# Patient Record
Sex: FEMALE | Race: WHITE | NOT HISPANIC OR LATINO | URBAN - METROPOLITAN AREA
[De-identification: names, ages, dates, MRNs, and addresses within clinical notes are randomized per-mention and may not be internally consistent; named-entity substitution may affect disease eponyms.]

---

## 2022-06-15 ENCOUNTER — EMERGENCY (EMERGENCY)
Facility: HOSPITAL | Age: 52
LOS: 1 days | Discharge: HOME | End: 2022-06-15
Attending: EMERGENCY MEDICINE | Admitting: EMERGENCY MEDICINE
Payer: MEDICAID

## 2022-06-15 VITALS
OXYGEN SATURATION: 98 % | SYSTOLIC BLOOD PRESSURE: 153 MMHG | TEMPERATURE: 98 F | RESPIRATION RATE: 20 BRPM | HEART RATE: 110 BPM | DIASTOLIC BLOOD PRESSURE: 85 MMHG | WEIGHT: 166.89 LBS

## 2022-06-15 VITALS
TEMPERATURE: 98 F | DIASTOLIC BLOOD PRESSURE: 95 MMHG | SYSTOLIC BLOOD PRESSURE: 149 MMHG | HEART RATE: 94 BPM | RESPIRATION RATE: 18 BRPM | OXYGEN SATURATION: 100 %

## 2022-06-15 LAB
ALBUMIN SERPL ELPH-MCNC: 4.1 G/DL — SIGNIFICANT CHANGE UP (ref 3.3–5)
ALP SERPL-CCNC: 176 U/L — HIGH (ref 40–120)
ALT FLD-CCNC: 115 U/L — HIGH (ref 12–78)
ANION GAP SERPL CALC-SCNC: 12 MMOL/L — SIGNIFICANT CHANGE UP (ref 5–17)
AST SERPL-CCNC: 100 U/L — HIGH (ref 15–37)
BASOPHILS # BLD AUTO: 0 K/UL — SIGNIFICANT CHANGE UP (ref 0–0.2)
BASOPHILS NFR BLD AUTO: 0 % — SIGNIFICANT CHANGE UP (ref 0–2)
BILIRUB SERPL-MCNC: 0.8 MG/DL — SIGNIFICANT CHANGE UP (ref 0.2–1.2)
BUN SERPL-MCNC: 4 MG/DL — LOW (ref 7–23)
CALCIUM SERPL-MCNC: 9.8 MG/DL — SIGNIFICANT CHANGE UP (ref 8.5–10.1)
CHLORIDE SERPL-SCNC: 105 MMOL/L — SIGNIFICANT CHANGE UP (ref 96–108)
CO2 SERPL-SCNC: 21 MMOL/L — LOW (ref 22–31)
CREAT SERPL-MCNC: 0.57 MG/DL — SIGNIFICANT CHANGE UP (ref 0.5–1.3)
EGFR: 111 ML/MIN/1.73M2 — SIGNIFICANT CHANGE UP
EOSINOPHIL # BLD AUTO: 0 K/UL — SIGNIFICANT CHANGE UP (ref 0–0.5)
EOSINOPHIL NFR BLD AUTO: 0 % — SIGNIFICANT CHANGE UP (ref 0–6)
GLUCOSE SERPL-MCNC: 125 MG/DL — HIGH (ref 70–99)
HCT VFR BLD CALC: 46.7 % — HIGH (ref 34.5–45)
HGB BLD-MCNC: 15.9 G/DL — HIGH (ref 11.5–15.5)
HMPV RNA SPEC QL NAA+PROBE: DETECTED
LYMPHOCYTES # BLD AUTO: 0.84 K/UL — LOW (ref 1–3.3)
LYMPHOCYTES # BLD AUTO: 5 % — LOW (ref 13–44)
MAGNESIUM SERPL-MCNC: 2.3 MG/DL — SIGNIFICANT CHANGE UP (ref 1.6–2.6)
MCHC RBC-ENTMCNC: 34 GM/DL — SIGNIFICANT CHANGE UP (ref 32–36)
MCHC RBC-ENTMCNC: 34 PG — SIGNIFICANT CHANGE UP (ref 27–34)
MCV RBC AUTO: 99.8 FL — SIGNIFICANT CHANGE UP (ref 80–100)
MONOCYTES # BLD AUTO: 0.67 K/UL — SIGNIFICANT CHANGE UP (ref 0–0.9)
MONOCYTES NFR BLD AUTO: 4 % — SIGNIFICANT CHANGE UP (ref 2–14)
NEUTROPHILS # BLD AUTO: 15.3 K/UL — HIGH (ref 1.8–7.4)
NEUTROPHILS NFR BLD AUTO: 89 % — HIGH (ref 43–77)
NRBC # BLD: SIGNIFICANT CHANGE UP /100 WBCS (ref 0–0)
PLATELET # BLD AUTO: 196 K/UL — SIGNIFICANT CHANGE UP (ref 150–400)
POTASSIUM SERPL-MCNC: 3.8 MMOL/L — SIGNIFICANT CHANGE UP (ref 3.5–5.3)
POTASSIUM SERPL-SCNC: 3.8 MMOL/L — SIGNIFICANT CHANGE UP (ref 3.5–5.3)
PROT SERPL-MCNC: 8.4 G/DL — HIGH (ref 6–8.3)
RAPID RVP RESULT: DETECTED
RBC # BLD: 4.68 M/UL — SIGNIFICANT CHANGE UP (ref 3.8–5.2)
RBC # FLD: 12.3 % — SIGNIFICANT CHANGE UP (ref 10.3–14.5)
SARS-COV-2 RNA SPEC QL NAA+PROBE: SIGNIFICANT CHANGE UP
SODIUM SERPL-SCNC: 138 MMOL/L — SIGNIFICANT CHANGE UP (ref 135–145)
TROPONIN I, HIGH SENSITIVITY RESULT: 4.7 NG/L — SIGNIFICANT CHANGE UP
WBC # BLD: 16.81 K/UL — HIGH (ref 3.8–10.5)
WBC # FLD AUTO: 16.81 K/UL — HIGH (ref 3.8–10.5)

## 2022-06-15 PROCEDURE — 85025 COMPLETE CBC W/AUTO DIFF WBC: CPT

## 2022-06-15 PROCEDURE — 80053 COMPREHEN METABOLIC PANEL: CPT

## 2022-06-15 PROCEDURE — 93010 ELECTROCARDIOGRAM REPORT: CPT

## 2022-06-15 PROCEDURE — 0225U NFCT DS DNA&RNA 21 SARSCOV2: CPT

## 2022-06-15 PROCEDURE — 83735 ASSAY OF MAGNESIUM: CPT

## 2022-06-15 PROCEDURE — 99285 EMERGENCY DEPT VISIT HI MDM: CPT | Mod: 25

## 2022-06-15 PROCEDURE — 84484 ASSAY OF TROPONIN QUANT: CPT

## 2022-06-15 PROCEDURE — 71046 X-RAY EXAM CHEST 2 VIEWS: CPT

## 2022-06-15 PROCEDURE — 36415 COLL VENOUS BLD VENIPUNCTURE: CPT

## 2022-06-15 PROCEDURE — 71046 X-RAY EXAM CHEST 2 VIEWS: CPT | Mod: 26

## 2022-06-15 PROCEDURE — 99285 EMERGENCY DEPT VISIT HI MDM: CPT

## 2022-06-15 PROCEDURE — 93005 ELECTROCARDIOGRAM TRACING: CPT

## 2022-06-15 RX ORDER — CEFTRIAXONE 500 MG/1
1000 INJECTION, POWDER, FOR SOLUTION INTRAMUSCULAR; INTRAVENOUS ONCE
Refills: 0 | Status: COMPLETED | OUTPATIENT
Start: 2022-06-15 | End: 2022-06-15

## 2022-06-15 RX ORDER — SODIUM CHLORIDE 9 MG/ML
1000 INJECTION INTRAMUSCULAR; INTRAVENOUS; SUBCUTANEOUS ONCE
Refills: 0 | Status: COMPLETED | OUTPATIENT
Start: 2022-06-15 | End: 2022-06-15

## 2022-06-15 RX ADMIN — CEFTRIAXONE 100 MILLIGRAM(S): 500 INJECTION, POWDER, FOR SOLUTION INTRAMUSCULAR; INTRAVENOUS at 22:36

## 2022-06-15 RX ADMIN — SODIUM CHLORIDE 1000 MILLILITER(S): 9 INJECTION INTRAMUSCULAR; INTRAVENOUS; SUBCUTANEOUS at 21:08

## 2022-06-15 RX ADMIN — CEFTRIAXONE 1000 MILLIGRAM(S): 500 INJECTION, POWDER, FOR SOLUTION INTRAMUSCULAR; INTRAVENOUS at 23:11

## 2022-06-15 RX ADMIN — SODIUM CHLORIDE 1000 MILLILITER(S): 9 INJECTION INTRAMUSCULAR; INTRAVENOUS; SUBCUTANEOUS at 22:10

## 2022-06-15 NOTE — ED PROVIDER NOTE - NS ED ATTENDING STATEMENT MOD
This was a shared visit with the JOSUE. I reviewed and verified the documentation and independently performed the documented:

## 2022-06-15 NOTE — ED PROVIDER NOTE - NSFOLLOWUPINSTRUCTIONS_ED_ALL_ED_FT
An upper respiratory infection (URI) affects the nose, throat, and upper air passages. URIs are caused by germs (viruses). The most common type of URI is often called "the common cold."    Medicines cannot cure URIs, but you can do things at home to relieve your symptoms. URIs usually get better within 7–10 days.      Follow these instructions at home:    Activity     •Rest as needed.    •If you have a fever, stay home from work or school until your fever is gone, or until your doctor says you may return to work or school.  •You should stay home until you cannot spread the infection anymore (you are not contagious).      •Your doctor may have you wear a face mask so you have less risk of spreading the infection.        Relieving symptoms     •Gargle with a salt-water mixture 3–4 times a day or as needed. To make a salt-water mixture, completely dissolve ½–1 tsp of salt in 1 cup of warm water.      •Use a cool-mist humidifier to add moisture to the air. This can help you breathe more easily.        Eating and drinking      •Drink enough fluid to keep your pee (urine) pale yellow.      •Eat soups and other clear broths.        General instructions      •Take over-the-counter and prescription medicines only as told by your doctor. These include cold medicines, fever reducers, and cough suppressants.      • Do not use any products that contain nicotine or tobacco. These include cigarettes and e-cigarettes. If you need help quitting, ask your doctor.      •Avoid being where people are smoking (avoid secondhand smoke).      •Make sure you get regular shots and get the flu shot every year.      •Keep all follow-up visits as told by your doctor. This is important.        How to avoid spreading infection to others      •Wash your hands often with soap and water. If you do not have soap and water, use hand .      •Avoid touching your mouth, face, eyes, or nose.      •Cough or sneeze into a tissue or your sleeve or elbow. Do not cough or sneeze into your hand or into the air.        Contact a doctor if:    •You are getting worse, not better.    •You have any of these:  •A fever.      •Chills.      •Brown or red mucus in your nose.      •Yellow or brown fluid (discharge)coming from your nose.      •Pain in your face, especially when you bend forward.      •Swollen neck glands.      •Pain with swallowing.      •White areas in the back of your throat.          Get help right away if:    •You have shortness of breath that gets worse.    •You have very bad or constant:  •Headache.      •Ear pain.      •Pain in your forehead, behind your eyes, and over your cheekbones (sinus pain).      •Chest pain.      •You have long-lasting (chronic) lung disease along with any of these:  •Wheezing.      •Long-lasting cough.      •Coughing up blood.      •A change in your usual mucus.        •You have a stiff neck.    •You have changes in your:  •Vision.      •Hearing.      •Thinking.      •Mood.          Summary    •An upper respiratory infection (URI) is caused by a germ called a virus. The most common type of URI is often called "the common cold."      •URIs usually get better within 7–10 days.      •Take over-the-counter and prescription medicines only as told by your doctor.      This information is not intended to replace advice given to you by your health care provider. Make sure you discuss any questions you have with your health care provider. Continue your medications as prescribed.  Stay hydrated.  Motrin for pain, fever.  Follow up with your medical doctor for repeat labs and further evaluation.  Return to the Emergency Department for worsening or concerning symptoms.    An upper respiratory infection (URI) affects the nose, throat, and upper air passages. URIs are caused by germs (viruses). The most common type of URI is often called "the common cold."    Medicines cannot cure URIs, but you can do things at home to relieve your symptoms. URIs usually get better within 7–10 days.      Follow these instructions at home:    Activity     •Rest as needed.    •If you have a fever, stay home from work or school until your fever is gone, or until your doctor says you may return to work or school.  •You should stay home until you cannot spread the infection anymore (you are not contagious).      •Your doctor may have you wear a face mask so you have less risk of spreading the infection.        Relieving symptoms     •Gargle with a salt-water mixture 3–4 times a day or as needed. To make a salt-water mixture, completely dissolve ½–1 tsp of salt in 1 cup of warm water.      •Use a cool-mist humidifier to add moisture to the air. This can help you breathe more easily.        Eating and drinking      •Drink enough fluid to keep your pee (urine) pale yellow.      •Eat soups and other clear broths.        General instructions      •Take over-the-counter and prescription medicines only as told by your doctor. These include cold medicines, fever reducers, and cough suppressants.      • Do not use any products that contain nicotine or tobacco. These include cigarettes and e-cigarettes. If you need help quitting, ask your doctor.      •Avoid being where people are smoking (avoid secondhand smoke).      •Make sure you get regular shots and get the flu shot every year.      •Keep all follow-up visits as told by your doctor. This is important.        How to avoid spreading infection to others      •Wash your hands often with soap and water. If you do not have soap and water, use hand .      •Avoid touching your mouth, face, eyes, or nose.      •Cough or sneeze into a tissue or your sleeve or elbow. Do not cough or sneeze into your hand or into the air.        Contact a doctor if:    •You are getting worse, not better.    •You have any of these:  •A fever.      •Chills.      •Brown or red mucus in your nose.      •Yellow or brown fluid (discharge)coming from your nose.      •Pain in your face, especially when you bend forward.      •Swollen neck glands.      •Pain with swallowing.      •White areas in the back of your throat.          Get help right away if:    •You have shortness of breath that gets worse.    •You have very bad or constant:  •Headache.      •Ear pain.      •Pain in your forehead, behind your eyes, and over your cheekbones (sinus pain).      •Chest pain.      •You have long-lasting (chronic) lung disease along with any of these:  •Wheezing.      •Long-lasting cough.      •Coughing up blood.      •A change in your usual mucus.        •You have a stiff neck.    •You have changes in your:  •Vision.      •Hearing.      •Thinking.      •Mood.          Summary    •An upper respiratory infection (URI) is caused by a germ called a virus. The most common type of URI is often called "the common cold."      •URIs usually get better within 7–10 days.      •Take over-the-counter and prescription medicines only as told by your doctor.      This information is not intended to replace advice given to you by your health care provider. Make sure you discuss any questions you have with your health care provider.

## 2022-06-15 NOTE — ED PROVIDER NOTE - CLINICAL SUMMARY MEDICAL DECISION MAKING FREE TEXT BOX
53 yo F p/w sore throat x past 2 - 3 days. No chest pain, pt with some occ palpitations , hot flashes. No recent trauma. No other recent illness. Pt with chronic sinus dz - inc sx over past ~ 1 month. Pt seen by urgent care - on Prednisone / abx yest. no agg/allev factors. no other acute co or changes.   exam: MM Moist. neck supple. no meningeal signs. abd soft NT. no hsm. no cvat. nl non-focal neuro exam. CTA Bl, no w/r/r. no acc muscle use. No c/c/e. no other acute findings.  check labs, xr, IVF, cont abx  elev WBC cw newly on steroids.

## 2022-06-15 NOTE — ED PROVIDER NOTE - OBJECTIVE STATEMENT
52 F c/o palpitations, hot flashes, rhinorrhea, nausea, sore throat since yesterday. Reports chronic issues with her sinuses. Went to urgent care and was started on amoxicillin, prednisone, flonase, claritin D. 52 F c/o palpitations, hot flashes, rhinorrhea, nausea, sore throat since yesterday. Reports chronic issues with her sinuses. Went to urgent care and was started on amoxicillin, prednisone, flonase, claritin D. Had negative covid tests.

## 2022-06-15 NOTE — ED ADULT NURSE NOTE - OBJECTIVE STATEMENT
Pt presented to ED c/o elevated bp, palpitations and congestion x 1 month, worsening symptoms today.  Denies any chest pain or discomfort but does feel palpitations at times.  No shortness of breath noted.  Pt does have sinus congestion.  No n/v/d.

## 2022-06-15 NOTE — ED ADULT TRIAGE NOTE - CHIEF COMPLAINT QUOTE
52 yr old female c/o chronic sinusitis, cough, fatigue, HTN, intermittent palpitations x 2 weeks. Was seen at urgent care and instructed to come to ED for evaluation. Denies CP, abdominal pain, syncope.

## 2022-06-15 NOTE — ED PROVIDER NOTE - PATIENT PORTAL LINK FT
You can access the FollowMyHealth Patient Portal offered by [facility] by registering at the following website: [email or web address]. By joining [employer]’s FollowMyHealth portal, you will also be able to view your health information using other applications (apps) compatible with our system.

## 2024-03-30 ENCOUNTER — INPATIENT (INPATIENT)
Facility: HOSPITAL | Age: 54
LOS: 3 days | Discharge: HOME | DRG: 433 | End: 2024-04-03
Attending: FAMILY MEDICINE | Admitting: INTERNAL MEDICINE
Payer: MEDICAID

## 2024-03-30 VITALS
TEMPERATURE: 98 F | OXYGEN SATURATION: 99 % | SYSTOLIC BLOOD PRESSURE: 97 MMHG | DIASTOLIC BLOOD PRESSURE: 52 MMHG | WEIGHT: 153 LBS | HEART RATE: 102 BPM | HEIGHT: 67 IN | RESPIRATION RATE: 16 BRPM

## 2024-03-30 DIAGNOSIS — Z29.9 ENCOUNTER FOR PROPHYLACTIC MEASURES, UNSPECIFIED: ICD-10-CM

## 2024-03-30 DIAGNOSIS — K92.2 GASTROINTESTINAL HEMORRHAGE, UNSPECIFIED: ICD-10-CM

## 2024-03-30 DIAGNOSIS — K52.9 NONINFECTIVE GASTROENTERITIS AND COLITIS, UNSPECIFIED: ICD-10-CM

## 2024-03-30 DIAGNOSIS — R93.89 ABNORMAL FINDINGS ON DIAGNOSTIC IMAGING OF OTHER SPECIFIED BODY STRUCTURES: ICD-10-CM

## 2024-03-30 DIAGNOSIS — R10.9 UNSPECIFIED ABDOMINAL PAIN: ICD-10-CM

## 2024-03-30 DIAGNOSIS — D62 ACUTE POSTHEMORRHAGIC ANEMIA: ICD-10-CM

## 2024-03-30 PROBLEM — Z78.9 OTHER SPECIFIED HEALTH STATUS: Chronic | Status: ACTIVE | Noted: 2022-06-15

## 2024-03-30 LAB
ALBUMIN SERPL ELPH-MCNC: 2.6 G/DL — LOW (ref 3.3–5)
ALBUMIN SERPL ELPH-MCNC: 2.8 G/DL — LOW (ref 3.3–5)
ALP SERPL-CCNC: 142 U/L — HIGH (ref 40–120)
ALP SERPL-CCNC: 155 U/L — HIGH (ref 40–120)
ALT FLD-CCNC: 67 U/L — SIGNIFICANT CHANGE UP (ref 12–78)
ALT FLD-CCNC: 68 U/L — SIGNIFICANT CHANGE UP (ref 12–78)
ANION GAP SERPL CALC-SCNC: 11 MMOL/L — SIGNIFICANT CHANGE UP (ref 5–17)
ANION GAP SERPL CALC-SCNC: 11 MMOL/L — SIGNIFICANT CHANGE UP (ref 5–17)
ANISOCYTOSIS BLD QL: SLIGHT — SIGNIFICANT CHANGE UP
APPEARANCE UR: CLEAR — SIGNIFICANT CHANGE UP
APTT BLD: 34.3 SEC — SIGNIFICANT CHANGE UP (ref 24.5–35.6)
AST SERPL-CCNC: 197 U/L — HIGH (ref 15–37)
AST SERPL-CCNC: 216 U/L — HIGH (ref 15–37)
BASOPHILS # BLD AUTO: 0.04 K/UL — SIGNIFICANT CHANGE UP (ref 0–0.2)
BASOPHILS # BLD AUTO: 0.05 K/UL — SIGNIFICANT CHANGE UP (ref 0–0.2)
BASOPHILS NFR BLD AUTO: 0.2 % — SIGNIFICANT CHANGE UP (ref 0–2)
BASOPHILS NFR BLD AUTO: 0.2 % — SIGNIFICANT CHANGE UP (ref 0–2)
BILIRUB SERPL-MCNC: 4.3 MG/DL — HIGH (ref 0.2–1.2)
BILIRUB SERPL-MCNC: 5.7 MG/DL — HIGH (ref 0.2–1.2)
BILIRUB UR-MCNC: NEGATIVE — SIGNIFICANT CHANGE UP
BLD GP AB SCN SERPL QL: SIGNIFICANT CHANGE UP
BUN SERPL-MCNC: 45 MG/DL — HIGH (ref 7–23)
BUN SERPL-MCNC: 55 MG/DL — HIGH (ref 7–23)
CALCIUM SERPL-MCNC: 7.2 MG/DL — LOW (ref 8.5–10.1)
CALCIUM SERPL-MCNC: 8.3 MG/DL — LOW (ref 8.5–10.1)
CHLORIDE SERPL-SCNC: 100 MMOL/L — SIGNIFICANT CHANGE UP (ref 96–108)
CHLORIDE SERPL-SCNC: 93 MMOL/L — LOW (ref 96–108)
CO2 SERPL-SCNC: 23 MMOL/L — SIGNIFICANT CHANGE UP (ref 22–31)
CO2 SERPL-SCNC: 26 MMOL/L — SIGNIFICANT CHANGE UP (ref 22–31)
COLOR SPEC: YELLOW — SIGNIFICANT CHANGE UP
CREAT SERPL-MCNC: 0.73 MG/DL — SIGNIFICANT CHANGE UP (ref 0.5–1.3)
CREAT SERPL-MCNC: 0.96 MG/DL — SIGNIFICANT CHANGE UP (ref 0.5–1.3)
CRP SERPL-MCNC: 54 MG/L — HIGH
DIFF PNL FLD: NEGATIVE — SIGNIFICANT CHANGE UP
EGFR: 100 ML/MIN/1.73M2 — SIGNIFICANT CHANGE UP
EGFR: 72 ML/MIN/1.73M2 — SIGNIFICANT CHANGE UP
EOSINOPHIL # BLD AUTO: 0.14 K/UL — SIGNIFICANT CHANGE UP (ref 0–0.5)
EOSINOPHIL # BLD AUTO: 0.3 K/UL — SIGNIFICANT CHANGE UP (ref 0–0.5)
EOSINOPHIL NFR BLD AUTO: 0.9 % — SIGNIFICANT CHANGE UP (ref 0–6)
EOSINOPHIL NFR BLD AUTO: 1.1 % — SIGNIFICANT CHANGE UP (ref 0–6)
ERYTHROCYTE [SEDIMENTATION RATE] IN BLOOD: 42 MM/HR — HIGH (ref 0–20)
FERRITIN SERPL-MCNC: 1237 NG/ML — HIGH (ref 13–330)
GLUCOSE SERPL-MCNC: 109 MG/DL — HIGH (ref 70–99)
GLUCOSE SERPL-MCNC: 117 MG/DL — HIGH (ref 70–99)
GLUCOSE UR QL: NEGATIVE MG/DL — SIGNIFICANT CHANGE UP
HCG SERPL-ACNC: 1 MIU/ML — SIGNIFICANT CHANGE UP
HCT VFR BLD CALC: 14.6 % — CRITICAL LOW (ref 34.5–45)
HCT VFR BLD CALC: 18.7 % — CRITICAL LOW (ref 34.5–45)
HGB BLD-MCNC: 5 G/DL — CRITICAL LOW (ref 11.5–15.5)
HGB BLD-MCNC: 6.5 G/DL — CRITICAL LOW (ref 11.5–15.5)
IMM GRANULOCYTES NFR BLD AUTO: 0.6 % — SIGNIFICANT CHANGE UP (ref 0–0.9)
IMM GRANULOCYTES NFR BLD AUTO: 1 % — HIGH (ref 0–0.9)
INR BLD: 1.64 RATIO — HIGH (ref 0.85–1.18)
KETONES UR-MCNC: NEGATIVE MG/DL — SIGNIFICANT CHANGE UP
LACTATE SERPL-SCNC: 1.6 MMOL/L — SIGNIFICANT CHANGE UP (ref 0.7–2)
LACTATE SERPL-SCNC: 1.8 MMOL/L — SIGNIFICANT CHANGE UP (ref 0.7–2)
LACTATE SERPL-SCNC: 4.1 MMOL/L — CRITICAL HIGH (ref 0.7–2)
LEUKOCYTE ESTERASE UR-ACNC: NEGATIVE — SIGNIFICANT CHANGE UP
LIDOCAIN IGE QN: 22 U/L — SIGNIFICANT CHANGE UP (ref 13–75)
LYMPHOCYTES # BLD AUTO: 0.94 K/UL — LOW (ref 1–3.3)
LYMPHOCYTES # BLD AUTO: 2.26 K/UL — SIGNIFICANT CHANGE UP (ref 1–3.3)
LYMPHOCYTES # BLD AUTO: 5.8 % — LOW (ref 13–44)
LYMPHOCYTES # BLD AUTO: 8.3 % — LOW (ref 13–44)
MACROCYTES BLD QL: SLIGHT — SIGNIFICANT CHANGE UP
MAGNESIUM SERPL-MCNC: 1.5 MG/DL — LOW (ref 1.6–2.6)
MANUAL SMEAR VERIFICATION: SIGNIFICANT CHANGE UP
MCHC RBC-ENTMCNC: 31.4 PG — SIGNIFICANT CHANGE UP (ref 27–34)
MCHC RBC-ENTMCNC: 34.2 GM/DL — SIGNIFICANT CHANGE UP (ref 32–36)
MCHC RBC-ENTMCNC: 34.8 GM/DL — SIGNIFICANT CHANGE UP (ref 32–36)
MCHC RBC-ENTMCNC: 35 PG — HIGH (ref 27–34)
MCV RBC AUTO: 102.1 FL — HIGH (ref 80–100)
MCV RBC AUTO: 90.3 FL — SIGNIFICANT CHANGE UP (ref 80–100)
MONOCYTES # BLD AUTO: 0.94 K/UL — HIGH (ref 0–0.9)
MONOCYTES # BLD AUTO: 1.83 K/UL — HIGH (ref 0–0.9)
MONOCYTES NFR BLD AUTO: 5.8 % — SIGNIFICANT CHANGE UP (ref 2–14)
MONOCYTES NFR BLD AUTO: 6.7 % — SIGNIFICANT CHANGE UP (ref 2–14)
NEUTROPHILS # BLD AUTO: 13.98 K/UL — HIGH (ref 1.8–7.4)
NEUTROPHILS # BLD AUTO: 22.41 K/UL — HIGH (ref 1.8–7.4)
NEUTROPHILS NFR BLD AUTO: 82.7 % — HIGH (ref 43–77)
NEUTROPHILS NFR BLD AUTO: 86.7 % — HIGH (ref 43–77)
NITRITE UR-MCNC: NEGATIVE — SIGNIFICANT CHANGE UP
NRBC # BLD: 0 /100 WBCS — SIGNIFICANT CHANGE UP (ref 0–0)
NRBC # BLD: 0 /100 WBCS — SIGNIFICANT CHANGE UP (ref 0–0)
OB PNL STL: NEGATIVE — SIGNIFICANT CHANGE UP
PH UR: 5.5 — SIGNIFICANT CHANGE UP (ref 5–8)
PHOSPHATE SERPL-MCNC: 2.5 MG/DL — SIGNIFICANT CHANGE UP (ref 2.5–4.5)
PLAT MORPH BLD: NORMAL — SIGNIFICANT CHANGE UP
PLATELET # BLD AUTO: 129 K/UL — LOW (ref 150–400)
PLATELET # BLD AUTO: 96 K/UL — LOW (ref 150–400)
POLYCHROMASIA BLD QL SMEAR: SLIGHT — SIGNIFICANT CHANGE UP
POTASSIUM SERPL-MCNC: 3.2 MMOL/L — LOW (ref 3.5–5.3)
POTASSIUM SERPL-MCNC: 3.8 MMOL/L — SIGNIFICANT CHANGE UP (ref 3.5–5.3)
POTASSIUM SERPL-SCNC: 3.2 MMOL/L — LOW (ref 3.5–5.3)
POTASSIUM SERPL-SCNC: 3.8 MMOL/L — SIGNIFICANT CHANGE UP (ref 3.5–5.3)
PROT SERPL-MCNC: 5.6 G/DL — LOW (ref 6–8.3)
PROT SERPL-MCNC: 5.9 G/DL — LOW (ref 6–8.3)
PROT UR-MCNC: NEGATIVE MG/DL — SIGNIFICANT CHANGE UP
PROTHROM AB SERPL-ACNC: 18.9 SEC — HIGH (ref 9.5–13)
RBC # BLD: 1.43 M/UL — LOW (ref 3.8–5.2)
RBC # BLD: 2.07 M/UL — LOW (ref 3.8–5.2)
RBC # BLD: 2.07 M/UL — LOW (ref 3.8–5.2)
RBC # FLD: 14.6 % — HIGH (ref 10.3–14.5)
RBC # FLD: 21.7 % — HIGH (ref 10.3–14.5)
RBC BLD AUTO: ABNORMAL
RETICS #: 80.7 K/UL — SIGNIFICANT CHANGE UP (ref 25–125)
RETICS/RBC NFR: 3.9 % — HIGH (ref 0.5–2.5)
SODIUM SERPL-SCNC: 130 MMOL/L — LOW (ref 135–145)
SODIUM SERPL-SCNC: 134 MMOL/L — LOW (ref 135–145)
SP GR SPEC: 1.04 — HIGH (ref 1–1.03)
TROPONIN I, HIGH SENSITIVITY RESULT: 18.7 NG/L — SIGNIFICANT CHANGE UP
UROBILINOGEN FLD QL: 1 MG/DL — SIGNIFICANT CHANGE UP (ref 0.2–1)
WBC # BLD: 16.13 K/UL — HIGH (ref 3.8–10.5)
WBC # BLD: 27.13 K/UL — HIGH (ref 3.8–10.5)
WBC # FLD AUTO: 16.13 K/UL — HIGH (ref 3.8–10.5)
WBC # FLD AUTO: 27.13 K/UL — HIGH (ref 3.8–10.5)

## 2024-03-30 PROCEDURE — 99285 EMERGENCY DEPT VISIT HI MDM: CPT

## 2024-03-30 PROCEDURE — 93010 ELECTROCARDIOGRAM REPORT: CPT

## 2024-03-30 PROCEDURE — 70450 CT HEAD/BRAIN W/O DYE: CPT | Mod: 26,MC

## 2024-03-30 PROCEDURE — 74177 CT ABD & PELVIS W/CONTRAST: CPT | Mod: 26,MC

## 2024-03-30 PROCEDURE — 99053 MED SERV 10PM-8AM 24 HR FAC: CPT

## 2024-03-30 PROCEDURE — 99223 1ST HOSP IP/OBS HIGH 75: CPT | Mod: GC

## 2024-03-30 RX ORDER — OCTREOTIDE ACETATE 200 UG/ML
50 INJECTION, SOLUTION INTRAVENOUS; SUBCUTANEOUS ONCE
Refills: 0 | Status: COMPLETED | OUTPATIENT
Start: 2024-03-30 | End: 2024-03-30

## 2024-03-30 RX ORDER — CEFTRIAXONE 500 MG/1
INJECTION, POWDER, FOR SOLUTION INTRAMUSCULAR; INTRAVENOUS
Refills: 0 | Status: DISCONTINUED | OUTPATIENT
Start: 2024-03-30 | End: 2024-03-31

## 2024-03-30 RX ORDER — POTASSIUM CHLORIDE 20 MEQ
10 PACKET (EA) ORAL
Refills: 0 | Status: COMPLETED | OUTPATIENT
Start: 2024-03-30 | End: 2024-03-30

## 2024-03-30 RX ORDER — ACETAMINOPHEN 500 MG
1000 TABLET ORAL ONCE
Refills: 0 | Status: DISCONTINUED | OUTPATIENT
Start: 2024-03-30 | End: 2024-04-03

## 2024-03-30 RX ORDER — ACETAMINOPHEN 500 MG
1000 TABLET ORAL ONCE
Refills: 0 | Status: COMPLETED | OUTPATIENT
Start: 2024-03-30 | End: 2024-03-30

## 2024-03-30 RX ORDER — LANOLIN ALCOHOL/MO/W.PET/CERES
3 CREAM (GRAM) TOPICAL AT BEDTIME
Refills: 0 | Status: DISCONTINUED | OUTPATIENT
Start: 2024-03-30 | End: 2024-04-03

## 2024-03-30 RX ORDER — PANTOPRAZOLE SODIUM 20 MG/1
40 TABLET, DELAYED RELEASE ORAL ONCE
Refills: 0 | Status: COMPLETED | OUTPATIENT
Start: 2024-03-30 | End: 2024-03-30

## 2024-03-30 RX ORDER — NICOTINE POLACRILEX 2 MG
1 GUM BUCCAL DAILY
Refills: 0 | Status: DISCONTINUED | OUTPATIENT
Start: 2024-03-30 | End: 2024-04-03

## 2024-03-30 RX ORDER — SUCRALFATE 1 G
1 TABLET ORAL EVERY 6 HOURS
Refills: 0 | Status: DISCONTINUED | OUTPATIENT
Start: 2024-03-30 | End: 2024-04-03

## 2024-03-30 RX ORDER — POTASSIUM CHLORIDE 20 MEQ
20 PACKET (EA) ORAL
Refills: 0 | Status: DISCONTINUED | OUTPATIENT
Start: 2024-03-30 | End: 2024-03-30

## 2024-03-30 RX ORDER — ONDANSETRON 8 MG/1
4 TABLET, FILM COATED ORAL ONCE
Refills: 0 | Status: COMPLETED | OUTPATIENT
Start: 2024-03-30 | End: 2024-03-30

## 2024-03-30 RX ORDER — SODIUM CHLORIDE 9 MG/ML
500 INJECTION INTRAMUSCULAR; INTRAVENOUS; SUBCUTANEOUS ONCE
Refills: 0 | Status: COMPLETED | OUTPATIENT
Start: 2024-03-30 | End: 2024-03-30

## 2024-03-30 RX ORDER — ONDANSETRON 8 MG/1
4 TABLET, FILM COATED ORAL EVERY 8 HOURS
Refills: 0 | Status: DISCONTINUED | OUTPATIENT
Start: 2024-03-30 | End: 2024-04-01

## 2024-03-30 RX ORDER — SODIUM CHLORIDE 9 MG/ML
1000 INJECTION INTRAMUSCULAR; INTRAVENOUS; SUBCUTANEOUS ONCE
Refills: 0 | Status: COMPLETED | OUTPATIENT
Start: 2024-03-30 | End: 2024-03-30

## 2024-03-30 RX ORDER — CEFTRIAXONE 500 MG/1
1000 INJECTION, POWDER, FOR SOLUTION INTRAMUSCULAR; INTRAVENOUS ONCE
Refills: 0 | Status: COMPLETED | OUTPATIENT
Start: 2024-03-30 | End: 2024-03-30

## 2024-03-30 RX ORDER — PANTOPRAZOLE SODIUM 20 MG/1
8 TABLET, DELAYED RELEASE ORAL
Qty: 80 | Refills: 0 | Status: DISCONTINUED | OUTPATIENT
Start: 2024-03-30 | End: 2024-04-01

## 2024-03-30 RX ORDER — CEFTRIAXONE 500 MG/1
1000 INJECTION, POWDER, FOR SOLUTION INTRAMUSCULAR; INTRAVENOUS EVERY 24 HOURS
Refills: 0 | Status: DISCONTINUED | OUTPATIENT
Start: 2024-03-31 | End: 2024-03-31

## 2024-03-30 RX ORDER — PIPERACILLIN AND TAZOBACTAM 4; .5 G/20ML; G/20ML
3.38 INJECTION, POWDER, LYOPHILIZED, FOR SOLUTION INTRAVENOUS ONCE
Refills: 0 | Status: DISCONTINUED | OUTPATIENT
Start: 2024-03-30 | End: 2024-03-30

## 2024-03-30 RX ORDER — OCTREOTIDE ACETATE 200 UG/ML
50 INJECTION, SOLUTION INTRAVENOUS; SUBCUTANEOUS
Qty: 500 | Refills: 0 | Status: DISCONTINUED | OUTPATIENT
Start: 2024-03-30 | End: 2024-04-01

## 2024-03-30 RX ORDER — ACETAMINOPHEN 500 MG
650 TABLET ORAL EVERY 6 HOURS
Refills: 0 | Status: DISCONTINUED | OUTPATIENT
Start: 2024-03-30 | End: 2024-04-03

## 2024-03-30 RX ORDER — SODIUM CHLORIDE 9 MG/ML
1000 INJECTION INTRAMUSCULAR; INTRAVENOUS; SUBCUTANEOUS ONCE
Refills: 0 | Status: DISCONTINUED | OUTPATIENT
Start: 2024-03-30 | End: 2024-03-30

## 2024-03-30 RX ORDER — ONDANSETRON 8 MG/1
4 TABLET, FILM COATED ORAL
Refills: 0 | Status: DISCONTINUED | OUTPATIENT
Start: 2024-03-30 | End: 2024-03-30

## 2024-03-30 RX ORDER — TRAMADOL HYDROCHLORIDE 50 MG/1
25 TABLET ORAL EVERY 4 HOURS
Refills: 0 | Status: DISCONTINUED | OUTPATIENT
Start: 2024-03-30 | End: 2024-04-03

## 2024-03-30 RX ADMIN — OCTREOTIDE ACETATE 50 MICROGRAM(S): 200 INJECTION, SOLUTION INTRAVENOUS; SUBCUTANEOUS at 10:23

## 2024-03-30 RX ADMIN — Medication 1000 MILLIGRAM(S): at 08:30

## 2024-03-30 RX ADMIN — Medication 100 MILLIEQUIVALENT(S): at 12:19

## 2024-03-30 RX ADMIN — ONDANSETRON 4 MILLIGRAM(S): 8 TABLET, FILM COATED ORAL at 05:25

## 2024-03-30 RX ADMIN — SODIUM CHLORIDE 1000 MILLILITER(S): 9 INJECTION INTRAMUSCULAR; INTRAVENOUS; SUBCUTANEOUS at 05:26

## 2024-03-30 RX ADMIN — Medication 100 MILLIEQUIVALENT(S): at 11:15

## 2024-03-30 RX ADMIN — Medication 1 GRAM(S): at 22:27

## 2024-03-30 RX ADMIN — SODIUM CHLORIDE 500 MILLILITER(S): 9 INJECTION INTRAMUSCULAR; INTRAVENOUS; SUBCUTANEOUS at 08:50

## 2024-03-30 RX ADMIN — PANTOPRAZOLE SODIUM 10 MG/HR: 20 TABLET, DELAYED RELEASE ORAL at 16:20

## 2024-03-30 RX ADMIN — Medication 1 PATCH: at 12:19

## 2024-03-30 RX ADMIN — Medication 1 GRAM(S): at 17:15

## 2024-03-30 RX ADMIN — ONDANSETRON 4 MILLIGRAM(S): 8 TABLET, FILM COATED ORAL at 12:26

## 2024-03-30 RX ADMIN — PANTOPRAZOLE SODIUM 40 MILLIGRAM(S): 20 TABLET, DELAYED RELEASE ORAL at 06:14

## 2024-03-30 RX ADMIN — OCTREOTIDE ACETATE 10 MICROGRAM(S)/HR: 200 INJECTION, SOLUTION INTRAVENOUS; SUBCUTANEOUS at 20:36

## 2024-03-30 RX ADMIN — CEFTRIAXONE 100 MILLIGRAM(S): 500 INJECTION, POWDER, FOR SOLUTION INTRAMUSCULAR; INTRAVENOUS at 10:15

## 2024-03-30 RX ADMIN — Medication 400 MILLIGRAM(S): at 07:44

## 2024-03-30 RX ADMIN — Medication 100 MILLIEQUIVALENT(S): at 10:15

## 2024-03-30 RX ADMIN — SODIUM CHLORIDE 500 MILLILITER(S): 9 INJECTION INTRAMUSCULAR; INTRAVENOUS; SUBCUTANEOUS at 07:45

## 2024-03-30 RX ADMIN — OCTREOTIDE ACETATE 10 MICROGRAM(S)/HR: 200 INJECTION, SOLUTION INTRAVENOUS; SUBCUTANEOUS at 10:24

## 2024-03-30 RX ADMIN — Medication 1000 MILLIGRAM(S): at 08:00

## 2024-03-30 RX ADMIN — Medication 1 PATCH: at 19:51

## 2024-03-30 RX ADMIN — Medication 1 GRAM(S): at 12:20

## 2024-03-30 RX ADMIN — Medication 3 MILLIGRAM(S): at 22:27

## 2024-03-30 NOTE — H&P ADULT - ASSESSMENT
53y F, no PMH, admitted for UGI bleed.  53y F, etoh abuse, per pt no other PMH, admitted for UGI bleed.

## 2024-03-30 NOTE — H&P ADULT - NSHPREVIEWOFSYSTEMS_GEN_ALL_CORE
REVIEW OF SYSTEMS:    CONSTITUTIONAL: + weakness, chills  EYES/ENT:  + blurry vision,   No vertigo or throat pain   NECK:  No pain or stiffness  RESPIRATORY:  No cough, wheezing, hemoptysis; No shortness of breath  CARDIOVASCULAR:  No chest pain, + palpitations  GASTROINTESTINAL:  + abd pain, NVD, hemoptysis  GENITOURINARY:  No dysuria, frequency or hematuria  NEUROLOGICAL:  No numbness or weakness  SKIN:  No itching, rashes

## 2024-03-30 NOTE — CONSULT NOTE ADULT - NS PANP COMMENT GEN_ALL_CORE FT
Ms. Márquez is a 53 year old lady with past history of alcohol abuse p/w UGIB. Concerning for variceal bleeding.   -Maintain SBPs . If BP increases too much, may disrupt clot. Would therefore use very judicious IVF if needed.  -GI following, f/u recs.  -Goal hemoglobin 7 for transfusion.  -Continue octreotide and ceftriaxone. Leukocytosis likely reactive to bleed.  -Continue protonix in case this is a gastric/duodenal ulcer.  -NPO for now.  -Maintain two active large bore IVs.  -Maintain active Type and Screen.  -Frequent CBCs.    Patient does not require ICU level care at this time, but please re-consult if status changes.

## 2024-03-30 NOTE — ED PROVIDER NOTE - GASTROINTESTINAL, MLM
Abdomen soft, mild lower abdominal -tenderness, no guarding. no rebound no CVAT BS normal,  [facility] [place] , Stool for occult blood sent.

## 2024-03-30 NOTE — H&P ADULT - PROBLEM SELECTOR PLAN 3
increased wbc 27, multifactorral stress induced + infection  lactate 4.1 trend  f/u blood cx, urine cx  GI PCR  s/p 1.5L  ceftriaxone   ID consulted

## 2024-03-30 NOTE — H&P ADULT - PROBLEM SELECTOR PLAN 2
Blood loss 2/2   hgh 5.0 on admission  s/p 1.5 L fluid  currently with 1 unit pRBC, second unit following Blood loss 2/2   hgh 5.0 on admission  s/p 1.5 L fluid  currently with 1 unit PRBC, second unit following

## 2024-03-30 NOTE — ED PROVIDER NOTE - SIGNIFICANT NEGATIVE FINDINGS
no headache, no chest pain, no SOB, no palpitations, no fever, no chills , no urinary symptoms, no stroke like symptoms.

## 2024-03-30 NOTE — CONSULT NOTE ADULT - SUBJECTIVE AND OBJECTIVE BOX
COOKIE ALDRICH  MRN-[id number]  Patient is a 53y old  Female who presents with a chief complaint of   HPI:    REVIEW OF SYSTEMS:  Gen: No fever  EYES/ENT: No visual changes;  No vertigo or throat pain   NECK: No pain   RES:  No shortness of breath or Cough  CARD: No chest pain   GI: No abdominal pain  : No dysuria  NEURO: No weakness  SKIN: No itching, rashes     Physical Exam:  Vital Signs Last 24 Hrs  T(C): 36.7 (30 Mar 2024 07:24), Max: 36.7 (30 Mar 2024 04:31)  T(F): 98 (30 Mar 2024 07:24), Max: 98.1 (30 Mar 2024 04:31)  HR: 98 (30 Mar 2024 07:24) (83 - 102)  BP: 88/46 (30 Mar 2024 07:24) (88/46 - 97/52)  BP(mean): --  RR: 18 (30 Mar 2024 07:24) (16 - 18)  SpO2: 97% (30 Mar 2024 07:24) (97% - 99%)    Parameters below as of 30 Mar 2024 07:24  Patient On (Oxygen Delivery Method): room air    Gen: Comfortably sitting in bed, not in acute distress  CNS: Awake, alert and oriented, nonfocal   Neck: supple  RES : clear to auscultation, no wheezing or rales                      CVS: Regular rate and rhythm. Normal S1/S2.  Abd: Soft, non-distended, nontender. Bowel sounds present.  Skin: Warm and dry  Ext: no edema noted    ============================ LABS =========================             5.0    27.13 )-----------( 129      ( 30 Mar 2024 04:59 )             14.6     03-30    130<L>  |  93<L>  |  55<H>  ----------------------------<  109<H>  3.2<L>   |  26  |  0.96    Ca    8.3<L>      30 Mar 2024 04:59    TPro  5.9<L>  /  Alb  2.8<L>  /  TBili  4.3<H>  /  DBili  x   /  AST  197<H>  /  ALT  68  /  AlkPhos  155<H>  03-30    LIVER FUNCTIONS - ( 30 Mar 2024 04:59 )  Alb: 2.8 g/dL / Pro: 5.9 g/dL / ALK PHOS: 155 U/L / ALT: 68 U/L / AST: 197 U/L / GGT: x           Urinalysis Basic - ( 30 Mar 2024 04:59 )  Gluc: 109 mg/dL     ======================Micro/Rad/Cardio=================  < from: CT Abdomen and Pelvis w/ IV Cont (03.30.24 @ 06:11) >   CT ABDOMEN AND PELVIS IC   ORDERED BY: JOLIE BANEGAS     PROCEDURE DATE:  03/30/2024          INTERPRETATION:  CLINICAL INFORMATION: Abdominal pain.    COMPARISON: None.    CONTRAST/COMPLICATIONS:  IV Contrast: Omnipaque 350  90 cc administered   10 cc discarded  Oral Contrast: NONE  Complications: None reported at time of study completion    PROCEDURE:  CT of the Abdomen and Pelvis was performed.  Sagittal and coronal reformats were performed.    FINDINGS:  LOWER CHEST: No consolidation or pleural effusion. Partially imaged right   breast implant.    LIVER: Markedly enlarged measuring 27.5 cm in length. Cirrhotic   morphology. Heterogeneous containing multiple poorly defined   hypodensities. Consider nonemergent MR to exclude hepatocellular   malignancy or metastasis.  BILE DUCTS: Normal caliber.  GALLBLADDER: Within normal limits.  SPLEEN: Enlarged measuring 15.2 cm in length.  PANCREAS: Within normal limits.  ADRENALS: Within normal limits.  KIDNEYS/URETERS: Within normal limits.    BLADDER: Mild wall thickening, difficult to assess secondary to   inadequate distention.  REPRODUCTIVE ORGANS: Intrauterine device in situ. Suggestion of 1.4 cm   posterior uterine fibroid.    BOWEL: No bowel obstruction.Normal appendix. Diverticulosis coli.  PERITONEUM: Trace pelvic free fluid.  VESSELS: Atherosclerotic changes. Multiple collateral vessels in the   upper abdomen with recanalization of the umbilical vein.  RETROPERITONEUM/LYMPH NODES: No lymphadenopathy.  ABDOMINAL WALL: Cutaneous umbilical ring.  BONES: Mild spondylotic changes of the spine.    IMPRESSION:    Cirrhosis with findings of portal hypertension.    Heterogeneous liver containing multiple poorly defined hypodensities.   Consider nonemergent MR to exclude hepatocellular malignancy or   metastasis.    Hepatosplenomegaly.    Mild bladder wall thickening, difficult to assess secondary to inadequate   distention. Correlate with urinalysis and lab values to assess for   cystitis and/or ascending urinary tract infection.  ======================================================  PAST MEDICAL & SURGICAL HISTORY:  No pertinent past medical history     COOKIE ALDRICH  MRN-[id number]  Patient is a 53y old  Female who presents with a chief complaint of   HPI: 52 y/o female with no significant PMHx who presented to the ED c/o abd pain, dark brown vomitus and loose stools for the past week. Pt reports feeling weakness, dizziness for the past 3-4 days. States she had a fever (Tmax of 102F at home) for which she reports taking Motrin. Otherwise denies any acute complaints.   MICU consulted for hypotension and acute blood loss anemia.     REVIEW OF SYSTEMS:  Gen: Reports weakness.   EYES/ENT: + Dizziness otherwise negative  NECK: No pain   RES: No shortness of breath  CARD: No chest pain  GI: No abdominal pain    Physical Exam:  Vital Signs Last 24 Hrs  T(C): 36.7 (30 Mar 2024 07:24), Max: 36.7 (30 Mar 2024 04:31)  T(F): 98 (30 Mar 2024 07:24), Max: 98.1 (30 Mar 2024 04:31)  HR: 98 (30 Mar 2024 07:24) (83 - 102)  BP: 88/46 (30 Mar 2024 07:24) (88/46 - 97/52)  BP(mean): --  RR: 18 (30 Mar 2024 07:24) (16 - 18)  SpO2: 97% (30 Mar 2024 07:24) (97% - 99%)    Parameters below as of 30 Mar 2024 07:24  Patient On (Oxygen Delivery Method): room air    Gen: Comfortably sitting in bed, not in acute distress  CNS: Awake, alert and oriented, nonfocal   Neck: supple  RES : clear to auscultation, no wheezing or rales                      CVS: Regular rate and rhythm. Normal S1/S2.  Abd: Soft, non-distended, nontender. Bowel sounds present.  Skin: Warm and dry  Ext: no edema noted    ============================ LABS =========================             5.0    27.13 )-----------( 129      ( 30 Mar 2024 04:59 )             14.6     03-30    130<L>  |  93<L>  |  55<H>  ----------------------------<  109<H>  3.2<L>   |  26  |  0.96    Ca    8.3<L>      30 Mar 2024 04:59    TPro  5.9<L>  /  Alb  2.8<L>  /  TBili  4.3<H>  /  DBili  x   /  AST  197<H>  /  ALT  68  /  AlkPhos  155<H>  03-30    LIVER FUNCTIONS - ( 30 Mar 2024 04:59 )  Alb: 2.8 g/dL / Pro: 5.9 g/dL / ALK PHOS: 155 U/L / ALT: 68 U/L / AST: 197 U/L / GGT: x           Urinalysis Basic - ( 30 Mar 2024 04:59 )  Gluc: 109 mg/dL     ======================Micro/Rad/Cardio=================  < from: CT Abdomen and Pelvis w/ IV Cont (03.30.24 @ 06:11) >   CT ABDOMEN AND PELVIS IC   ORDERED BY: JOLIE BANEGAS     PROCEDURE DATE:  03/30/2024          INTERPRETATION:  CLINICAL INFORMATION: Abdominal pain.    COMPARISON: None.    CONTRAST/COMPLICATIONS:  IV Contrast: Omnipaque 350  90 cc administered   10 cc discarded  Oral Contrast: NONE  Complications: None reported at time of study completion    PROCEDURE:  CT of the Abdomen and Pelvis was performed.  Sagittal and coronal reformats were performed.    FINDINGS:  LOWER CHEST: No consolidation or pleural effusion. Partially imaged right   breast implant.    LIVER: Markedly enlarged measuring 27.5 cm in length. Cirrhotic   morphology. Heterogeneous containing multiple poorly defined   hypodensities. Consider nonemergent MR to exclude hepatocellular   malignancy or metastasis.  BILE DUCTS: Normal caliber.  GALLBLADDER: Within normal limits.  SPLEEN: Enlarged measuring 15.2 cm in length.  PANCREAS: Within normal limits.  ADRENALS: Within normal limits.  KIDNEYS/URETERS: Within normal limits.    BLADDER: Mild wall thickening, difficult to assess secondary to   inadequate distention.  REPRODUCTIVE ORGANS: Intrauterine device in situ. Suggestion of 1.4 cm   posterior uterine fibroid.    BOWEL: No bowel obstruction.Normal appendix. Diverticulosis coli.  PERITONEUM: Trace pelvic free fluid.  VESSELS: Atherosclerotic changes. Multiple collateral vessels in the   upper abdomen with recanalization of the umbilical vein.  RETROPERITONEUM/LYMPH NODES: No lymphadenopathy.  ABDOMINAL WALL: Cutaneous umbilical ring.  BONES: Mild spondylotic changes of the spine.    IMPRESSION:    Cirrhosis with findings of portal hypertension.    Heterogeneous liver containing multiple poorly defined hypodensities.   Consider nonemergent MR to exclude hepatocellular malignancy or   metastasis.    Hepatosplenomegaly.    Mild bladder wall thickening, difficult to assess secondary to inadequate   distention. Correlate with urinalysis and lab values to assess for   cystitis and/or ascending urinary tract infection.  ======================================================  PAST MEDICAL & SURGICAL HISTORY:  No pertinent past medical history     COOKIE ALDRICH  MRN-[id number]  Patient is a 53y old  Female who presents with a chief complaint of   HPI: 52 y/o female with no significant PMHx who presented to the ED c/o abd pain, dark brown vomitus and loose stools for the past week. Pt reports feeling weakness, dizziness for the past 3-4 days. States she had a fever (Tmax of 102F at home) for which she reports taking Motrin. Otherwise denies any acute complaints. Denies previous hx of GI bleed.   In the ED, labs significant for Hgb 5.0, WBC 27k, lactate 4.1, transaminitis, bilirubin total 4.3. CT A/P with IV contrast demonstrates   MICU consulted for hypotension and acute blood loss anemia.     REVIEW OF SYSTEMS:  Gen: Reports weakness.   EYES/ENT: + Dizziness otherwise negative  NECK: No pain   RES: No shortness of breath  CARD: No chest pain  GI: No abdominal pain    Physical Exam:  Vital Signs Last 24 Hrs  T(C): 36.7 (30 Mar 2024 07:24), Max: 36.7 (30 Mar 2024 04:31)  T(F): 98 (30 Mar 2024 07:24), Max: 98.1 (30 Mar 2024 04:31)  HR: 98 (30 Mar 2024 07:24) (83 - 102)  BP: 88/46 (30 Mar 2024 07:24) (88/46 - 97/52)  BP(mean): --  RR: 18 (30 Mar 2024 07:24) (16 - 18)  SpO2: 97% (30 Mar 2024 07:24) (97% - 99%)    Parameters below as of 30 Mar 2024 07:24  Patient On (Oxygen Delivery Method): room air    Gen: Comfortably sitting in bed, not in acute distress  CNS: Awake, alert and oriented, nonfocal   Neck: supple  RES : clear to auscultation, no wheezing or rales                      CVS: Regular rate and rhythm. Normal S1/S2.  Abd: Soft, non-distended, nontender. Bowel sounds present.  Skin: Warm and dry  Ext: no edema noted    ============================ LABS =========================             5.0    27.13 )-----------( 129      ( 30 Mar 2024 04:59 )             14.6     03-30    130<L>  |  93<L>  |  55<H>  ----------------------------<  109<H>  3.2<L>   |  26  |  0.96    Ca    8.3<L>      30 Mar 2024 04:59    TPro  5.9<L>  /  Alb  2.8<L>  /  TBili  4.3<H>  /  DBili  x   /  AST  197<H>  /  ALT  68  /  AlkPhos  155<H>  03-30    LIVER FUNCTIONS - ( 30 Mar 2024 04:59 )  Alb: 2.8 g/dL / Pro: 5.9 g/dL / ALK PHOS: 155 U/L / ALT: 68 U/L / AST: 197 U/L / GGT: x           Urinalysis Basic - ( 30 Mar 2024 04:59 )  Gluc: 109 mg/dL     ======================Micro/Rad/Cardio=================  < from: CT Abdomen and Pelvis w/ IV Cont (03.30.24 @ 06:11) >   CT ABDOMEN AND PELVIS IC   ORDERED BY: JOLIE BANEGAS     PROCEDURE DATE:  03/30/2024          INTERPRETATION:  CLINICAL INFORMATION: Abdominal pain.    COMPARISON: None.    CONTRAST/COMPLICATIONS:  IV Contrast: Omnipaque 350  90 cc administered   10 cc discarded  Oral Contrast: NONE  Complications: None reported at time of study completion    PROCEDURE:  CT of the Abdomen and Pelvis was performed.  Sagittal and coronal reformats were performed.    FINDINGS:  LOWER CHEST: No consolidation or pleural effusion. Partially imaged right   breast implant.    LIVER: Markedly enlarged measuring 27.5 cm in length. Cirrhotic   morphology. Heterogeneous containing multiple poorly defined   hypodensities. Consider nonemergent MR to exclude hepatocellular   malignancy or metastasis.  BILE DUCTS: Normal caliber.  GALLBLADDER: Within normal limits.  SPLEEN: Enlarged measuring 15.2 cm in length.  PANCREAS: Within normal limits.  ADRENALS: Within normal limits.  KIDNEYS/URETERS: Within normal limits.    BLADDER: Mild wall thickening, difficult to assess secondary to   inadequate distention.  REPRODUCTIVE ORGANS: Intrauterine device in situ. Suggestion of 1.4 cm   posterior uterine fibroid.    BOWEL: No bowel obstruction.Normal appendix. Diverticulosis coli.  PERITONEUM: Trace pelvic free fluid.  VESSELS: Atherosclerotic changes. Multiple collateral vessels in the   upper abdomen with recanalization of the umbilical vein.  RETROPERITONEUM/LYMPH NODES: No lymphadenopathy.  ABDOMINAL WALL: Cutaneous umbilical ring.  BONES: Mild spondylotic changes of the spine.    IMPRESSION:    Cirrhosis with findings of portal hypertension.    Heterogeneous liver containing multiple poorly defined hypodensities.   Consider nonemergent MR to exclude hepatocellular malignancy or   metastasis.    Hepatosplenomegaly.    Mild bladder wall thickening, difficult to assess secondary to inadequate   distention. Correlate with urinalysis and lab values to assess for   cystitis and/or ascending urinary tract infection.  ======================================================  PAST MEDICAL & SURGICAL HISTORY:  No pertinent past medical history     COOKIE ALDRICH  MRN-[id number]  Patient is a 53y old  Female who presents with a chief complaint of   HPI: 52 y/o female with no significant PMHx who presented to the ED c/o abd pain, dark brown vomitus and loose stools for the past week. Pt reports feeling weakness, dizziness for the past 3-4 days. States she had a fever (Tmax of 102F at home) for which she reports taking Motrin. Otherwise denies any acute complaints. Denies previous hx of GI bleed.   In the ED, labs significant for Hgb 5.0, WBC 27k, lactate 4.1, transaminitis, bilirubin total 4.3. CT A/P with IV contrast demonstrates Cirrhosis with findings of portal hypertension. Heterogeneous liver containing multiple poorly defined hypodensities.   MICU consulted for hypotension and acute blood loss anemia.     REVIEW OF SYSTEMS:  Gen: Reports weakness.   EYES/ENT: + Dizziness otherwise negative  NECK: No pain   RES: No shortness of breath  CARD: No chest pain  GI: No abdominal pain    Physical Exam:  Vital Signs Last 24 Hrs  T(C): 36.7 (30 Mar 2024 07:24), Max: 36.7 (30 Mar 2024 04:31)  T(F): 98 (30 Mar 2024 07:24), Max: 98.1 (30 Mar 2024 04:31)  HR: 98 (30 Mar 2024 07:24) (83 - 102)  BP: 88/46 (30 Mar 2024 07:24) (88/46 - 97/52)  BP(mean): --  RR: 18 (30 Mar 2024 07:24) (16 - 18)  SpO2: 97% (30 Mar 2024 07:24) (97% - 99%)    Parameters below as of 30 Mar 2024 07:24  Patient On (Oxygen Delivery Method): room air    Gen: Comfortably sitting in bed, not in acute distress  CNS: Awake, alert and oriented, nonfocal   Neck: supple  RES : clear to auscultation, no wheezing or rales                      CVS: Regular rate and rhythm. Normal S1/S2.  Abd: Soft, non-distended, nontender. Bowel sounds present.  Skin: Warm and dry  Ext: no edema noted    ============================ LABS =========================             5.0    27.13 )-----------( 129      ( 30 Mar 2024 04:59 )             14.6     03-30    130<L>  |  93<L>  |  55<H>  ----------------------------<  109<H>  3.2<L>   |  26  |  0.96    Ca    8.3<L>      30 Mar 2024 04:59    TPro  5.9<L>  /  Alb  2.8<L>  /  TBili  4.3<H>  /  DBili  x   /  AST  197<H>  /  ALT  68  /  AlkPhos  155<H>  03-30    LIVER FUNCTIONS - ( 30 Mar 2024 04:59 )  Alb: 2.8 g/dL / Pro: 5.9 g/dL / ALK PHOS: 155 U/L / ALT: 68 U/L / AST: 197 U/L / GGT: x           Urinalysis Basic - ( 30 Mar 2024 04:59 )  Gluc: 109 mg/dL     ======================Micro/Rad/Cardio=================  < from: CT Abdomen and Pelvis w/ IV Cont (03.30.24 @ 06:11) >   CT ABDOMEN AND PELVIS IC   ORDERED BY: JOLIE BANEGAS     PROCEDURE DATE:  03/30/2024          INTERPRETATION:  CLINICAL INFORMATION: Abdominal pain.    COMPARISON: None.    CONTRAST/COMPLICATIONS:  IV Contrast: Omnipaque 350  90 cc administered   10 cc discarded  Oral Contrast: NONE  Complications: None reported at time of study completion    PROCEDURE:  CT of the Abdomen and Pelvis was performed.  Sagittal and coronal reformats were performed.    FINDINGS:  LOWER CHEST: No consolidation or pleural effusion. Partially imaged right   breast implant.    LIVER: Markedly enlarged measuring 27.5 cm in length. Cirrhotic   morphology. Heterogeneous containing multiple poorly defined   hypodensities. Consider nonemergent MR to exclude hepatocellular   malignancy or metastasis.  BILE DUCTS: Normal caliber.  GALLBLADDER: Within normal limits.  SPLEEN: Enlarged measuring 15.2 cm in length.  PANCREAS: Within normal limits.  ADRENALS: Within normal limits.  KIDNEYS/URETERS: Within normal limits.    BLADDER: Mild wall thickening, difficult to assess secondary to   inadequate distention.  REPRODUCTIVE ORGANS: Intrauterine device in situ. Suggestion of 1.4 cm   posterior uterine fibroid.    BOWEL: No bowel obstruction.Normal appendix. Diverticulosis coli.  PERITONEUM: Trace pelvic free fluid.  VESSELS: Atherosclerotic changes. Multiple collateral vessels in the   upper abdomen with recanalization of the umbilical vein.  RETROPERITONEUM/LYMPH NODES: No lymphadenopathy.  ABDOMINAL WALL: Cutaneous umbilical ring.  BONES: Mild spondylotic changes of the spine.    IMPRESSION:    Cirrhosis with findings of portal hypertension.    Heterogeneous liver containing multiple poorly defined hypodensities.   Consider nonemergent MR to exclude hepatocellular malignancy or   metastasis.    Hepatosplenomegaly.    Mild bladder wall thickening, difficult to assess secondary to inadequate   distention. Correlate with urinalysis and lab values to assess for   cystitis and/or ascending urinary tract infection.  ======================================================  PAST MEDICAL & SURGICAL HISTORY:  No pertinent past medical history     COOKIE ALDRICH  MRN-[id number]  Patient is a 53y old  Female who presents with a chief complaint of vomiting, dizziness and weakness  HPI: 52 y/o female with no significant PMHx who presented to the ED c/o abd pain, dark brown vomitus and loose stools for the past week. Pt reports feeling weakness, dizziness for the past 3-4 days. States she had a fever (Tmax of 102F at home) for which she reports taking Motrin. Otherwise denies any acute complaints. Denies previous hx of GI bleed.   In the ED, labs significant for Hgb 5.0, WBC 27k, lactate 4.1, transaminitis, bilirubin total 4.3. CT A/P with IV contrast demonstrates Cirrhosis with findings of portal hypertension. Heterogeneous liver containing multiple poorly defined hypodensities.   MICU consulted for hypotension and acute blood loss anemia.     REVIEW OF SYSTEMS:  Gen: Reports weakness.   EYES/ENT: + Dizziness otherwise negative  NECK: No pain   RES: No shortness of breath  CARD: No chest pain  GI: Denies abdominal pain at this time. +vomiting     Physical Exam:  Vital Signs Last 24 Hrs  T(C): 36.7 (30 Mar 2024 07:24), Max: 36.7 (30 Mar 2024 04:31)  T(F): 98 (30 Mar 2024 07:24), Max: 98.1 (30 Mar 2024 04:31)  HR: 98 (30 Mar 2024 07:24) (83 - 102)  BP: 88/46 (30 Mar 2024 07:24) (88/46 - 97/52)  BP(mean): --  RR: 18 (30 Mar 2024 07:24) (16 - 18)  SpO2: 97% (30 Mar 2024 07:24) (97% - 99%)    Parameters below as of 30 Mar 2024 07:24  Patient On (Oxygen Delivery Method): room air    Gen: Comfortably sitting in bed, not in acute distress  CNS: Awake, alert and oriented, nonfocal   Neck: supple  RES : clear to auscultation, no wheezing or rales                      CVS: Regular rate and rhythm. Normal S1/S2.  Abd: Soft, non-distended, nontender. Bowel sounds present.  Skin: Warm and dry  Ext: no edema noted    ============================ LABS =========================             5.0    27.13 )-----------( 129      ( 30 Mar 2024 04:59 )             14.6     03-30    130<L>  |  93<L>  |  55<H>  ----------------------------<  109<H>  3.2<L>   |  26  |  0.96    Ca    8.3<L>      30 Mar 2024 04:59    TPro  5.9<L>  /  Alb  2.8<L>  /  TBili  4.3<H>  /  DBili  x   /  AST  197<H>  /  ALT  68  /  AlkPhos  155<H>  03-30    LIVER FUNCTIONS - ( 30 Mar 2024 04:59 )  Alb: 2.8 g/dL / Pro: 5.9 g/dL / ALK PHOS: 155 U/L / ALT: 68 U/L / AST: 197 U/L / GGT: x           Urinalysis Basic - ( 30 Mar 2024 04:59 )  Gluc: 109 mg/dL     ======================Micro/Rad/Cardio=================  < from: CT Abdomen and Pelvis w/ IV Cont (03.30.24 @ 06:11) >   CT ABDOMEN AND PELVIS IC   ORDERED BY: JOLIE BANEGAS     PROCEDURE DATE:  03/30/2024          INTERPRETATION:  CLINICAL INFORMATION: Abdominal pain.    COMPARISON: None.    CONTRAST/COMPLICATIONS:  IV Contrast: Omnipaque 350  90 cc administered   10 cc discarded  Oral Contrast: NONE  Complications: None reported at time of study completion    PROCEDURE:  CT of the Abdomen and Pelvis was performed.  Sagittal and coronal reformats were performed.    FINDINGS:  LOWER CHEST: No consolidation or pleural effusion. Partially imaged right   breast implant.    LIVER: Markedly enlarged measuring 27.5 cm in length. Cirrhotic   morphology. Heterogeneous containing multiple poorly defined   hypodensities. Consider nonemergent MR to exclude hepatocellular   malignancy or metastasis.  BILE DUCTS: Normal caliber.  GALLBLADDER: Within normal limits.  SPLEEN: Enlarged measuring 15.2 cm in length.  PANCREAS: Within normal limits.  ADRENALS: Within normal limits.  KIDNEYS/URETERS: Within normal limits.    BLADDER: Mild wall thickening, difficult to assess secondary to   inadequate distention.  REPRODUCTIVE ORGANS: Intrauterine device in situ. Suggestion of 1.4 cm   posterior uterine fibroid.    BOWEL: No bowel obstruction.Normal appendix. Diverticulosis coli.  PERITONEUM: Trace pelvic free fluid.  VESSELS: Atherosclerotic changes. Multiple collateral vessels in the   upper abdomen with recanalization of the umbilical vein.  RETROPERITONEUM/LYMPH NODES: No lymphadenopathy.  ABDOMINAL WALL: Cutaneous umbilical ring.  BONES: Mild spondylotic changes of the spine.    IMPRESSION:    Cirrhosis with findings of portal hypertension.    Heterogeneous liver containing multiple poorly defined hypodensities.   Consider nonemergent MR to exclude hepatocellular malignancy or   metastasis.    Hepatosplenomegaly.    Mild bladder wall thickening, difficult to assess secondary to inadequate   distention. Correlate with urinalysis and lab values to assess for   cystitis and/or ascending urinary tract infection.  ======================================================  PAST MEDICAL & SURGICAL HISTORY:  No pertinent past medical history

## 2024-03-30 NOTE — ED PROVIDER NOTE - CLINICAL SUMMARY MEDICAL DECISION MAKING FREE TEXT BOX
52 y/o female C/O  abdominal pain, dark brown  vomitus and loose stools for the past 4 days. Found to have marked leukocytosis, lactic acidosis, severe anemia probably due to GI blood loss. CT scan no definite infection.  IVF, cultures, GI PCR panel,  IVAB, IV protonix, Packed RBC, ICU consult and admission

## 2024-03-30 NOTE — ED ADULT NURSE REASSESSMENT NOTE - NS ED NURSE REASSESS COMMENT FT1
pt aox4, lungs clear, abd soft + sounds, no n/v, # 2 IVL started, IVF infusing well , evaluated by ICU PA

## 2024-03-30 NOTE — ED ADULT NURSE NOTE - OBJECTIVE STATEMENT
pt presents to the ED c/o vomiting pt presents to the ED c/o vomiting and loose stools x 4days and dizziness and vision change x 1 day. denies chest pain, sob, difficulty breathing, fever, chills, black tarry stools, abdominal pain. aox4, MAEx4. iv lock 20 g placed to LAC, patent and flushing. no s/s redness, swelling or infiltration. labs collected and sent. hgb 5, type and screen and confirmatory collected. pending prbc's.

## 2024-03-30 NOTE — ED PROVIDER NOTE - CHPI ED SYMPTOMS POS
abdominal pain, dark brown  vomitus and loose stools for the past 4 days, she  felt dizzy and experienced  blurry vision.

## 2024-03-30 NOTE — ED PROVIDER NOTE - CARE PLAN
Principal Discharge DX:	Abdominal pain  Secondary Diagnosis:	GIB (gastrointestinal bleeding)  Secondary Diagnosis:	Anemia due to acute blood loss   1

## 2024-03-30 NOTE — CONSULT NOTE ADULT - CONSULT REASON
K70. 30   EtOH Cirrhosis  K92.2      GIB  D50.0      Fe def anemia   K92.0      Hematemesis.  I85. 01     Esophageal varices with bleeding  K76.6      Portal hypertension.  K92.1      Melena  R16.0      Liver masses K70. 30   EtOH Cirrhosis  K92.2      GIB  D62         Aneia hemorrhage  D50.0      Fe def anemia   D69.6      Thrombocytopenia due to cirrhosis  K92.0      Hematemesis.  I85. 01     Esophageal varices with bleeding  K76.6      Portal hypertension.  K92.1      Melena  R16.0      Liver masses

## 2024-03-30 NOTE — H&P ADULT - PROBLEM SELECTOR PLAN 1
multiple bouts of bloody emesis, vomiting clots of blood as per patient. Last emesis in AM.  Ct scan illustrating cirrhosis + portal hypertension, significant alcohol consumption hx ,  bloody emesis likely 2/2 variceal bleeding  hgh 5.0 -> currently getting 1 unit of pRBC will give 2nd unit following. f/u H and H  As per GI, will hold off fluids, will give blood product primarily  Bp currently in 100's systolic, increased chance for sudden decompensation  octreotide load and ggt  Pantoprazole injection and ggt  Zofran for nausea   GI consulted emergently

## 2024-03-30 NOTE — ED ADULT NURSE NOTE - CHIEF COMPLAINT QUOTE
per ems from home with vomiting and loose stools for the past 4 days, today felt dizzy, felt like her vision changed two hours ago

## 2024-03-30 NOTE — H&P ADULT - HISTORY OF PRESENT ILLNESS
52 yo F, Denies PMH presenting for 4 days of nausea and vomiting. Patient states that she had NVD begining 4 days ago with no inciting factors. States that 2 days following her symptoms, she began to vomit even more, this time with a more brown and bright red color huh. As she vomited more, she began to vomit clots of blood. She also noted to have diahreaa where she said it was more black/brown in nature. She is unclear if there was any bright red blood in her stool. Patient also states that as she began to vomit clots, she began to feel symptoms of palpitations, chills, dizziness. Patient states she came in today because she felt extremely weak. Of note, patient has 35PY history, and drank 8 shots daily for 30 years, with most recent drink 1.5 years ago. In ED, patient was found to have BP in the 70s systolic. HGH of 5.0. Patient currently receiving 1 unit packed blood cells.     In the ED pts VS were T(F): 98 HR: 98 BP: 88/46 RR: 18 SpO2: 97%  Labs show: H.0   WBC: 27  Plt:129  Creatinine: .96 Lactate 4.1 K 3.2     CT A/P:  Cirrhosis with findings of portal hypertension.    Heterogeneous liver containing multiple poorly defined hypodensities.   Consider nonemergent MR to exclude hepatocellular malignancy or   metastasis.    Hepatosplenomegaly.    Mild bladder wall thickening, difficult to assess secondary to inadequate   distention. Correlate with urinalysis and lab values to assess for   cystitis and/or ascending urinary tract infection.      S/p: 1 unit pRBC, Zofran 8mg, 1g tylenol IV, 40mg pantoprazole IV, 1.5L NS    Pt is admitted for further workup and management   52 yo F, Etoh abuse, Denies PMH presenting for 4 days of nausea and vomiting. Patient states that she had NVD beginning 4 days ago with no inciting factors. States that 2 days following her symptoms, she began to vomit even more, this time with a more brown and bright red color huh. As she vomited more, she began to vomit clots of blood. She also noted to have diarrhea where she said it was more black/brown in nature. She is unclear if there was any bright red blood in her stool. Patient also states that as she began to vomit clots, she began to feel symptoms of palpitations, chills, dizziness. Patient states she came in today because she felt extremely weak. Of note, patient has 35PY history, and drank 8 shots daily for 30 years, with most recent drink 1.5 years ago. In ED, patient was found to have BP in the 70s systolic. HGH of 5.0. Patient currently receiving 1 unit packed blood cells.     In the ED pts VS were T(F): 98 HR: 98 BP: 88/46 RR: 18 SpO2: 97%  Labs show: H.0   WBC: 27  Plt:129  Creatinine: .96 Lactate 4.1 K 3.2     CT A/P:  Cirrhosis with findings of portal hypertension.    Heterogeneous liver containing multiple poorly defined hypodensities.   Consider nonemergent MR to exclude hepatocellular malignancy or   metastasis.    Hepatosplenomegaly.    Mild bladder wall thickening, difficult to assess secondary to inadequate   distention. Correlate with urinalysis and lab values to assess for   cystitis and/or ascending urinary tract infection.      S/p: 1 unit pRBC, Zofran 8mg, 1g tylenol IV, 40mg pantoprazole IV, 1.5L NS    Pt is admitted for further workup and management

## 2024-03-30 NOTE — H&P ADULT - ATTENDING COMMENTS
53y F, etoh abuse, per pt no other PMH, admitted for UGI bleed, concern for hepatocellular carcinoma on imaging, suspicion for variceal bleed    GI consulted, care d/w Dr. Kebede, will assess role of EGD, started on octreotride gtt, protonix gtt, and cefriaxone, and ordered for 2 units PRBCs as Hb found to be 5.  ICU consulted, will f/u final recs and management plan.    Mando Blake, Attending Physician

## 2024-03-30 NOTE — CONSULT NOTE ADULT - ASSESSMENT
anemia  cirrhosis  'gi bleed    plan   upper gastrointestinal endoscopy on monday  ppi drip  octreotide drip  gerd precautions  daily cbc   transfuse prn   consider hematology eval  check iron studies   will need clearance  check stool occult blood  further recommendations pending above

## 2024-03-30 NOTE — ED PROVIDER NOTE - TEMPLATE
Patient was concerned about losing lashes, explained to the patient that the rosacea will do that but once things get under control with medication, it should go back to normal. If not, we discussed Latisse. General

## 2024-03-30 NOTE — CONSULT NOTE ADULT - SUBJECTIVE AND OBJECTIVE BOX
Chief Complaint:  Patient is a 53y old  Female who presents with a chief complaint of Anemia/gastroenteritis called to see pt with hematemesis with history fo cirrhiosis was a heavy alchoholic in the past now here for follow up, no n/v/d/f/c no melena had dark stool for 2 days never had an  upper gastrointestinal endoscopy  Reason for Admission: Anemia/gastroenteritis  History of Present Illness:   54 yo F, Etoh abuse, Denies PMH presenting for 4 days of nausea and vomiting. Patient states that she had NVD beginning 4 days ago with no inciting factors. States that 2 days following her symptoms, she began to vomit even more, this time with a more brown and bright red color huh. As she vomited more, she began to vomit clots of blood. She also noted to have diarrhea where she said it was more black/brown in nature. She is unclear if there was any bright red blood in her stool. Patient also states that as she began to vomit clots, she began to feel symptoms of palpitations, chills, dizziness. Patient states she came in today because she felt extremely weak. Of note, patient has 35PY history, and drank 8 shots daily for 30 years, with most recent drink 1.5 years ago. In ED, patient was found to have BP in the 70s systolic. HGH of 5.0. Patient currently receiving 1 unit packed blood cells.     In the ED pts VS were T(F): 98 HR: 98 BP: 88/46 RR: 18 SpO2: 97%  Labs show: H.0   WBC: 27  Plt:129  Creatinine: .96 Lactate 4.1 K 3.2     CT A/P:  Cirrhosis with findings of portal hypertension.    Heterogeneous liver containing multiple poorly defined hypodensities.   Consider nonemergent MR to exclude hepatocellular malignancy or   metastasis.    Hepatosplenomegaly.    Mild bladder wall thickening, difficult to assess secondary to inadequate   distention. Correlate with urinalysis and lab values to assess for   cystitis and/or ascending urinary tract infection.      S/p: 1 unit pRBC, Zofran 8mg, 1g tylenol IV, 40mg pantoprazole IV, 1.5L NS    Pt is admitted for further workup and management         Review of Systems:  Review of Systems: REVIEW OF SYSTEMS:    CONSTITUTIONAL: + weakness, chills  EYES/ENT:  + blurry vision,   No vertigo or throat pain   NECK:  No pain or stiffness  RESPIRATORY:  No cough, wheezing, hemoptysis; No shortness of breath  CARDIOVASCULAR:  No chest pain, + palpitations  GASTROINTESTINAL:  + abd pain, NVD, hemoptysis  GENITOURINARY:  No dysuria, frequency or hematuria  NEUROLOGICAL:  No numbness or weakness  SKIN:  No itching, rashes      Allergies:  No Known Allergies      Medications:  acetaminophen     Tablet .. 650 milliGRAM(s) Oral every 6 hours PRN  acetaminophen   IVPB .. 1000 milliGRAM(s) IV Intermittent once  artificial  tears Solution 1 Drop(s) Both EYES two times a day PRN  cefTRIAXone   IVPB      melatonin 3 milliGRAM(s) Oral at bedtime PRN  nicotine -   7 mG/24Hr(s) Patch 1 Patch Transdermal daily  octreotide  Infusion 50 MICROgram(s)/Hr IV Continuous <Continuous>  ondansetron Injectable 4 milliGRAM(s) IV Push every 8 hours PRN  pantoprazole Infusion 8 mG/Hr IV Continuous <Continuous>  sucralfate suspension 1 Gram(s) Oral every 6 hours  traMADol 25 milliGRAM(s) Oral every 4 hours PRN      PMHX/PSHX:  No pertinent past medical history    No significant past surgical history        Family history:      Social History:     ROS:     General:  No wt loss, fevers, chills, night sweats, fatigue,   Eyes:  Good vision, no reported pain  ENT:  No sore throat, pain, runny nose, dysphagia  CV:  No pain, palpitations, hypo/hypertension  Resp:  No dyspnea, cough, tachypnea, wheezing  GI:  No pain, No nausea, No vomiting, No diarrhea, No constipation, No weight loss, No fever, No pruritis, No rectal bleeding, No tarry stools, No dysphagia,  :  No pain, bleeding, incontinence, nocturia  Muscle:  No pain, weakness  Neuro:  No weakness, tingling, memory problems  Psych:  No fatigue, insomnia, mood problems, depression  Endocrine:  No polyuria, polydipsia, cold/heat intolerance  Heme:  No petechiae, ecchymosis, easy bruisability  Skin:  No rash, tattoos, scars, edema      PHYSICAL EXAM:   Vital Signs:  Vital Signs Last 24 Hrs  T(C): 37.3 (30 Mar 2024 22:36), Max: 37.3 (30 Mar 2024 22:36)  T(F): 99.1 (30 Mar 2024 22:36), Max: 99.1 (30 Mar 2024 22:36)  HR: 95 (30 Mar 2024 22:36) (83 - 102)  BP: 101/53 (30 Mar 2024 22:36) (88/46 - 108/62)  BP(mean): --  RR: 17 (30 Mar 2024 22:36) (16 - 18)  SpO2: 93% (30 Mar 2024 22:36) (91% - 99%)    Parameters below as of 30 Mar 2024 22:36  Patient On (Oxygen Delivery Method): room air      Daily Height in cm: 170.18 (30 Mar 2024 04:31)    Daily     GENERAL:  Appears stated age, well-groomed, well-nourished, no distress  HEENT:  NC/AT,  conjunctivae clear and pink, no thyromegaly, nodules, adenopathy, no JVD, sclera -anicteric  CHEST:  Full & symmetric excursion, no increased effort, breath sounds clear  HEART:  Regular rhythm, S1, S2, no murmur/rub/S3/S4, no abdominal bruit, no edema  ABDOMEN:  Soft, non-tender, non-distended, normoactive bowel sounds,  no masses ,no hepato-splenomegaly, no signs of chronic liver disease  EXTEREMITIES:  no cyanosis,clubbing or edema  SKIN:  No rash/erythema/ecchymoses/petechiae/wounds/abscess/warm/dry  NEURO:  Alert, oriented, no asterixis, no tremor, no encephalopathy    LABS:                        6.5    16.13 )-----------( 96       ( 30 Mar 2024 16:15 )             18.7     0330    134<L>  |  100  |  45<H>  ----------------------------<  117<H>  3.8   |  23  |  0.73    Ca    7.2<L>      30 Mar 2024 16:15  Phos  2.5     0330  Mg     1.5         TPro  5.6<L>  /  Alb  2.6<L>  /  TBili  5.7<H>  /  DBili  x   /  AST  216<H>  /  ALT  67  /  AlkPhos  142<H>  03-30    LIVER FUNCTIONS - ( 30 Mar 2024 16:15 )  Alb: 2.6 g/dL / Pro: 5.6 g/dL / ALK PHOS: 142 U/L / ALT: 67 U/L / AST: 216 U/L / GGT: x           PT/INR - ( 30 Mar 2024 16:15 )   PT: 18.9 sec;   INR: 1.64 ratio         PTT - ( 30 Mar 2024 16:15 )  PTT:34.3 sec  Urinalysis Basic - ( 30 Mar 2024 16:15 )    Color: x / Appearance: x / SG: x / pH: x  Gluc: 117 mg/dL / Ketone: x  / Bili: x / Urobili: x   Blood: x / Protein: x / Nitrite: x   Leuk Esterase: x / RBC: x / WBC x   Sq Epi: x / Non Sq Epi: x / Bacteria: x      Amylase Serum--      Lipase serum22       Ammonia--      Imaging:

## 2024-03-30 NOTE — ED ADULT TRIAGE NOTE - CHIEF COMPLAINT QUOTE
per ems from home with vomiting and loose stools for the past 4 days, today felt dizzy per ems from home with vomiting and loose stools for the past 4 days, today felt dizzy, felt like her vision changed two hours ago

## 2024-03-30 NOTE — H&P ADULT - PROBLEM SELECTOR PLAN 4
CT: Heterogeneous liver containing multiple poorly defined hypodensities.  F/u: alpha feto protein, Ca- 19  Hepatitis panel  f/u MRI  Heme/onc consulted

## 2024-03-30 NOTE — ED PROVIDER NOTE - OBJECTIVE STATEMENT
per ems from home with vomiting and loose stools for the past 4 days, today felt dizzy, felt like her vision changed two hours ago    see chief complaint quote 52 y/o female C/O per ems from home with abdominal pain, dark brown  vomitus and loose stools for the past 4 days, she  felt dizzy and experienced  blurry vision. no headache, no chest pain, no SOB, no palpitations, no fever, no chills , no urinary symptoms, no stroke like symptoms.

## 2024-03-30 NOTE — H&P ADULT - NSHPPHYSICALEXAM_GEN_ALL_CORE
VITALS:   T(C): 36.7 (03-30-24 @ 07:24), Max: 36.7 (03-30-24 @ 04:31)  HR: 98 (03-30-24 @ 07:24) (83 - 102)  BP: 88/46 (03-30-24 @ 07:24) (88/46 - 97/52)  RR: 18 (03-30-24 @ 07:24) (16 - 18)  SpO2: 97% (03-30-24 @ 07:24) (97% - 99%)    GENERAL: weak, jaundice,   HEAD:  Atraumatic  EYES: conjunctiva and sclera clear  ENT: Moist mucous membranes  NECK: Supple  HEART: Regular rate and rhythm, no murmurs, rubs, or gallops  LUNGS: Unlabored respirations.  Clear to auscultation bilaterally, no crackles, wheezing, or rhonchi  ABDOMEN: Tender, to palpation, hepatomegaly present, splenomegaly present, fluid wave present  EXTREMITIES: 2+ peripheral pulses bilaterally. No clubbing, cyanosis, or edema  NERVOUS SYSTEM:  A&Ox3, no focal deficits   SKIN: No rashes or lesions

## 2024-03-30 NOTE — CONSULT NOTE ADULT - ASSESSMENT
54 y/o female with no reported significant PMHx who is here for:     ASSESSMENT  1. Acute blood loss anemia  2. Hypotension   3. Transaminitis    Plan:  - Pt seen at bedside with Dr. Barahona. Upon examination, pt. is hemodynamically stable with pressure of 104/54 with first unit of PRBC currently being transfused.   ====================== NEUROLOGY=====================    ==================== RESPIRATORY======================  Mechanical Ventilation:      ====================CARDIOVASCULAR==================    ===================HEMATOLOGIC/ONC ===================    ===================== RENAL =========================  Continue monitoring urine output    ==================== GASTROINTESTINAL===================    =======================    ENDOCRINE  =====================    ========================INFECTIOUS DISEASE================  piperacillin/tazobactam IVPB... 3.375 Gram(s) IV Intermittent once      PLAN DISCUSSED IN DETAIL WITH ICU ATTENDING DR. BARAHONA WHO IS AGREEABLE TO THE ABOVE PLAN     DATE OF ENTRY OF THIS NOTE IS EQUAL TO THE DATE OF SERVICES RENDERED ****      Time spent on this patient encounter, which includes documenting this note in the electronic medical record, was 56 minutes including assessing the presenting problems with associated risks, reviewing medical records to prepare for the encounter, and meeting face to face with the patient to obtain additional history. I have also performed an appropriate physical exam, made interventions listed and ordered and interpreted appropriate diagnostic studies as documented. To improve communication and patient safety, I have coordinated care with the multidisciplinary team including the bedside nurse, appropriate attending of record and consultants as needed.       54 y/o female with no reported significant PMHx who is here for:     ASSESSMENT  1. Acute blood loss anemia  2. Hypotension   3. Transaminitis    Plan:  - Pt seen at bedside with Dr. Barahona. MICU consulted for hypotension and acute blood loss anemia. Upon examination, pt. is hemodynamically stable with pressure of 104/54. Pt already receiving 1st unit of PRBC upon arrival.   - Would ensure IV access with 2 large bore IVs.   - Transfuse PRBC for hgb greater than 7. Repeat CBC [id number].   - Recommend starting Protonix 40mg BID or drip, defer to GI preference. GI consult pending.   - Monitor liver enzymes, bilirubin. Would obtain RUQ sono. Trend Lactate. Monitor and replete electrolytes as needed to keep K>4, Mg>2, Phos>3.   - Pt received 1 dose of zosyn in ED, given elevated white count. Pt is currently afebrile, monitor WBC. F/u urinalysis, urine culture, GI PCR and blood cultures.     Pt does not require ICU level of care at this time given pt is hemodynamically stable. Please re-consult if any change in patient's condition.   PLAN DISCUSSED IN DETAIL WITH ICU ATTENDING DR. BARAHONA WHO IS AGREEABLE TO THE ABOVE PLAN     DATE OF ENTRY OF THIS NOTE IS EQUAL TO THE DATE OF SERVICES RENDERED ****    Time spent on this patient encounter, which includes documenting this note in the electronic medical record, was 56 minutes including assessing the presenting problems with associated risks, reviewing medical records to prepare for the encounter, and meeting face to face with the patient to obtain additional history. I have also performed an appropriate physical exam, made interventions listed and ordered and interpreted appropriate diagnostic studies as documented. To improve communication and patient safety, I have coordinated care with the multidisciplinary team including the bedside nurse, appropriate attending of record and consultants as needed.       52 y/o female with no reported significant PMHx who is here for:     ASSESSMENT  1. Acute blood loss anemia  2. Hypotension   3. Transaminitis    Plan:  - Pt seen at bedside with Dr. Barahona. MICU consulted for hypotension and acute blood loss anemia. Upon examination, pt. is hemodynamically stable with pressure of 104/54. Pt already receiving 1st unit of PRBC upon arrival.   - Would ensure IV access with 2 large bore IVs.   - Transfuse PRBC for hgb greater than 7. Repeat CBC [id number]. Ordered Ceftriaxone and Octreotide given concern for esophogeal varices. CT A/P with IV contrast demonstrates cirrhosis with signs of portal HTN.   - Recommend starting Protonix 40mg BID or drip, defer to GI preference. GI consult pending.   - Monitor liver enzymes, bilirubin. Trend Lactate. Monitor and replete electrolytes as needed to keep K>4, Mg>2, Phos>3.   - Pt received 1 dose of zosyn in ED, given elevated white count. Pt is currently afebrile, monitor WBC. F/u urinalysis, urine culture, GI PCR and blood cultures.     Pt does not require ICU level of care at this time given pt is hemodynamically stable. Please re-consult if any change in patient's condition.   PLAN DISCUSSED IN DETAIL WITH ICU ATTENDING DR. BARAHONA WHO IS AGREEABLE TO THE ABOVE PLAN     DATE OF ENTRY OF THIS NOTE IS EQUAL TO THE DATE OF SERVICES RENDERED ****    Time spent on this patient encounter, which includes documenting this note in the electronic medical record, was 56 minutes including assessing the presenting problems with associated risks, reviewing medical records to prepare for the encounter, and meeting face to face with the patient to obtain additional history. I have also performed an appropriate physical exam, made interventions listed and ordered and interpreted appropriate diagnostic studies as documented. To improve communication and patient safety, I have coordinated care with the multidisciplinary team including the bedside nurse, appropriate attending of record and consultants as needed.       52 y/o female with no reported significant PMHx who is here for:     ASSESSMENT  1. Acute blood loss anemia  2. Upper GI bleed  3. Hypotension   4. Transaminitis    Plan:  - Pt seen at bedside with Dr. Barahona. MICU consulted for hypotension and acute blood loss anemia. Upon examination, pt. is hemodynamically stable with pressure of 104/54. Pt already receiving 1st unit of PRBC upon arrival.   - Would ensure IV access with 2 large bore IVs.   - Transfuse PRBC for hgb greater than 7. Repeat CBC [id number]. Ordered Ceftriaxone and Octreotide given concern for esophogeal varices. CT A/P with IV contrast demonstrates cirrhosis with signs of portal HTN.   - Would maintain SBP . Avoid additional IVF.   - Recommend starting Protonix 40mg BID or drip, defer to GI preference. GI consult pending to evaluate for emergent EGD.   - Monitor liver enzymes, bilirubin. Trend Lactate. Monitor and replete electrolytes as needed to keep K>4, Mg>2, Phos>3.   - Pt received 1 dose of zosyn in ED, given elevated white count. Pt is currently afebrile, monitor WBC. F/u urinalysis, urine culture, GI PCR and blood cultures.     Pt does not require ICU level of care at this time given pt is hemodynamically stable. Please re-consult if any change in patient's condition.   PLAN DISCUSSED IN DETAIL WITH ICU ATTENDING DR. BARAHONA WHO IS AGREEABLE TO THE ABOVE PLAN     DATE OF ENTRY OF THIS NOTE IS EQUAL TO THE DATE OF SERVICES RENDERED ****    Time spent on this patient encounter, which includes documenting this note in the electronic medical record, was 56 minutes including assessing the presenting problems with associated risks, reviewing medical records to prepare for the encounter, and meeting face to face with the patient to obtain additional history. I have also performed an appropriate physical exam, made interventions listed and ordered and interpreted appropriate diagnostic studies as documented. To improve communication and patient safety, I have coordinated care with the multidisciplinary team including the bedside nurse, appropriate attending of record and consultants as needed.

## 2024-03-30 NOTE — CONSULT NOTE ADULT - ASSESSMENT
[ASSESSMENT and  PLAN]  K70. 30   EtOH Cirrhosis  K92.2      GIB  D50.0      Fe def anemia   K92.0      Hematemesis.  I85. 01     Esophageal varices with bleeding  K76.6      Portal hypertension.  K92.1      Melena  R16.0      Liver masses    54yo F with hx of EtOH abuse for 5-7yrs, presenting with GIB sx and symptomatic anemia  Hematemesis, suspected variceal bleeding.  Melena due to UGIB  Symptomatic anemia, requiring PRBC txfusion.   Hx Fe def anemia    Pt of [place], French and English ancestry.   Denies history of hemochromatosis  Denies hx of hepatitis    Liver with abn findings  Possible masses  Cirrhosis with findings of portal hypertension.  Heterogeneous liver containing multiple poorly defined hypodensities.   Consider nonemergent MR to exclude hepatocellular malignancy or metastasis.        RECOMMENDATIONS    GIB / Anemia  Transfuse PRBC as clinically indicated.   Transfuse PRBC if Hgb <7.0 or if symptomatic.   Follow CBC    Check Anemia studies.      Ferritin, Iron studies     B12, Folate     ESR, CRP  Consider hepatitis serologies    GI eval   Dr Gutierrez saw today.   tentative EGD on Mon    Liver Mass?  recommend MRI abd when stable.  Check AFP   check CEA    DVT Prophylaxis  SQ LMWH or Heparin Contraindicated  OOB as samia  SCD if warranted    Discussed plan of care with patient and or mother in detail.   Pt/Family expressed understanding of the treatment plan.   Risks, benefits and alternatives discussed in detail.   Opportunity given for questions and discussion.   Questions or concerns all addressed and answered to their satisfaction, and in lay terms.     Thank you for consulting us.     > 50  minutes spent in direct patient care, examining and counseling patient,  reviewing  the notes, lab data/ imaging , discussion with multidisciplinary team.      [ASSESSMENT and  PLAN]  K70. 30   EtOH Cirrhosis  K92.2      GIB  D62         Aneia hemorrhage  D50.0      Fe def anemia   D69.6      Thrombocytopenia due to cirrhosis  K92.0      Hematemesis.  I85. 01     Esophageal varices with bleeding  K76.6      Portal hypertension.  K92.1      Melena  R16.0      Liver masses    52yo F with hx of EtOH abuse for 5-7yrs, presenting with GIB sx and symptomatic anemia  Hematemesis, suspected variceal bleeding.  Melena due to UGIB  Symptomatic anemia, requiring PRBC txfusion.   Hx Fe def anemia    Pt of Citizen of [place], [place] and English ancestry.   Denies history of hemochromatosis  Denies hx of hepatitis    Anemia due to hemorrhage  Anemia due to chronic disease  possible anemia due to Fe def, hx of per pt  Thrombocytopenia due to cirrhosis  Some concern for hemochromatosis due to ancestry and cirrhosis, age.     Liver with abn findings  ?liver masses  Cirrhosis with findings of portal hypertension.  Heterogeneous liver containing multiple poorly defined hypodensities.   Consider nonemergent MR w and wo contrast to eval for hepatocellular malignancy or metastasis and iron overload.     RECOMMENDATIONS    GIB / Anemia  Transfuse PRBC as clinically indicated.   Transfuse PRBC if Hgb <7.0 or if symptomatic.   Follow CBC    Check Anemia studies.      Ferritin, Iron studies     B12, Folate     ESR, CRP  Consider hepatitis serologies    GI eval   Dr Gutierrez saw today.   tentative EGD on Mon    Liver Mass?  recommend MRI abd when stable.  Check AFP   check CEA    DVT Prophylaxis  SQ LMWH or Heparin Contraindicated  OOB as samia  SCD if warranted    Discussed plan of care with patient and or mother in detail.   Pt/Family expressed understanding of the treatment plan.   Risks, benefits and alternatives discussed in detail.   Opportunity given for questions and discussion.   Questions or concerns all addressed and answered to their satisfaction, and in lay terms.     Thank you for consulting us.     > 60 minutes spent in direct patient care, examining and counseling patient,  reviewing  the notes, lab data/ imaging , discussion with multidisciplinary team.

## 2024-03-30 NOTE — CONSULT NOTE ADULT - SUBJECTIVE AND OBJECTIVE BOX
INCOMPLETE NOTE.  Documentation in Progress  PT SEEN AND EVALUATED.   FULL/ADDITIONAL RECOMMENDATIONS TO FOLLOW   ***************************************************************    Patient is a 53y old  Female who presents with a chief complaint of Anemia/gastroenteritis (30 Mar 2024 08:18)      HPI:  52 yo F, Etoh abuse, Denies PMH presenting for 4 days of nausea and vomiting. Patient states that she had NVD beginning 4 days ago with no inciting factors. States that 2 days following her symptoms, she began to vomit even more, this time with a more brown and bright red color huh. As she vomited more, she began to vomit clots of blood. She also noted to have diarrhea where she said it was more black/brown in nature. She is unclear if there was any bright red blood in her stool. Patient also states that as she began to vomit clots, she began to feel symptoms of palpitations, chills, dizziness. Patient states she came in today because she felt extremely weak. Of note, patient has 35PY history, and drank 8 shots daily for 30 years, with most recent drink 1.5 years ago. In ED, patient was found to have BP in the 70s systolic. HGH of 5.0. Patient currently receiving 1 unit packed blood cells.     In the ED pts VS were T(F): 98 HR: 98 BP: 88/46 RR: 18 SpO2: 97%  Labs show: H.0   WBC: 27  Plt:129  Creatinine: .96 Lactate 4.1 K 3.2     CT A/P:  Cirrhosis with findings of portal hypertension.    Heterogeneous liver containing multiple poorly defined hypodensities.   Consider nonemergent MR to exclude hepatocellular malignancy or   metastasis.    Hepatosplenomegaly.    Mild bladder wall thickening, difficult to assess secondary to inadequate   distention. Correlate with urinalysis and lab values to assess for   cystitis and/or ascending urinary tract infection.      S/p: 1 unit pRBC, Zofran 8mg, 1g tylenol IV, 40mg pantoprazole IV, 1.5L NS    Pt is admitted for further workup and management   (30 Mar 2024 08:18)      PAST MEDICAL & SURGICAL HISTORY:  No pertinent past medical history      No significant past surgical history         HEALTH ISSUES - PROBLEM Dx:  GI bleed    Anemia due to acute blood loss    Gastroenteritis    Abnormal CT scan    Need for prophylactic measure          Abdominal pain [R10.9]    No pertinent past medical history [[id number]]    No significant past surgical history [[id number]]    GIB (gastrointestinal bleeding) [K92.2]    Anemia due to acute blood loss [D62]      FAMILY HISTORY:      [SOCIAL HISTORY: ]     smoking:  none currently     EtOH:  none currently     illicit drugs:  none currently     occupation:  Retired     marital status:       Other:       [ALLERGIES/INTOLERANCES:]  Allergies    No Known Allergies    Intolerances        [MEDICATIONS]  MEDICATIONS  (STANDING):  acetaminophen   IVPB .. 1000 milliGRAM(s) IV Intermittent once  cefTRIAXone   IVPB      nicotine -   7 mG/24Hr(s) Patch 1 Patch Transdermal daily  octreotide  Infusion 50 MICROgram(s)/Hr (10 mL/Hr) IV Continuous <Continuous>  pantoprazole Infusion 8 mG/Hr (10 mL/Hr) IV Continuous <Continuous>  sucralfate suspension 1 Gram(s) Oral every 6 hours    MEDICATIONS  (PRN):  acetaminophen     Tablet .. 650 milliGRAM(s) Oral every 6 hours PRN Temp greater or equal to 38C (100.4F), Mild Pain (1 - 3)  melatonin 3 milliGRAM(s) Oral at bedtime PRN Insomnia  ondansetron Injectable 4 milliGRAM(s) IV Push every 8 hours PRN Nausea and/or Vomiting  traMADol 25 milliGRAM(s) Oral every 4 hours PRN Moderate Pain (4 - 6)      [REVIEW OF SYSTEMS: ]  CONSTITUTIONAL: normal, no fever, no shakes, no chills   EYES: No eye pain, no visual disturbances, no discharge  ENMT:  no discharge  NECK: No pain, no stiffness  BREASTS: No pain, no masses, no nipple discharge  RESPIRATORY: No cough, no wheezing, no chills, no hemoptysis; No shortness of breath  CARDIOVASCULAR: No chest pain, no palpitations, no dizziness, no leg swelling  GASTROINTESTINAL: No abdominal, no epigastric pain. No nausea, no vomiting, no hematemesis; No diarrhea , no constipation. No melena, no hematochezia.  GENITOURINARY: No dysuria, no frequency, no hematuria, no incontinence  NEUROLOGICAL: No headaches, no memory loss, no loss of strength, no numbness, no tremors  SKIN: No itching, no burning, no rashes, no lesions   LYMPH NODES: No enlarged glands  ENDOCRINE: No heat or cold intolerance; No hair loss  MUSCULOSKELETAL: No joint pain or swelling; No muscle, no back, no extremity pain  PSYCHIATRIC: No depression, no anxiety, no mood swings, no difficulty sleeping  HEME/LYMPH: No easy bruising, no bleeding gums    [VITALS SIGNS 24hrs]  Vital Signs Last 24 Hrs  T(C): 36.8 (30 Mar 2024 13:55), Max: 36.8 (30 Mar 2024 13:00)  T(F): 98.3 (30 Mar 2024 13:55), Max: 98.3 (30 Mar 2024 13:55)  HR: 92 (30 Mar 2024 13:55) (83 - 102)  BP: 92/55 (30 Mar 2024 13:55) (88/46 - 104/60)  BP(mean): --  RR: 18 (30 Mar 2024 13:00) (16 - 18)  SpO2: 92% (30 Mar 2024 13:55) (92% - 99%)    Parameters below as of 30 Mar 2024 13:55  Patient On (Oxygen Delivery Method): room air      Daily Height in cm: 170.18 (30 Mar 2024 04:31)    Daily     I&O's Summary      [PHYSICAL EXAM]  GEN:   HEENT: normocephalic and atraumatic. EOMI. PERRL.    NECK: Supple.  No lymphadenopathy   LUNGS: Clear to auscultation.  HEART: S1S2 Regular rate and rhythm, no MRG  ABDOMEN: Soft, nontender, and nondistended.  Positive bowel sounds.    : No CVA tenderness  EXTREMITIES: Without edema.  NEUROLOGIC: grossly intact.  PSYCHIATRIC: Appropriate affect .  SKIN: No rash     [LABS: ]                        5.0    27.13 )-----------( 129      ( 30 Mar 2024 04:59 )             14.6     CBC Full  -  ( 30 Mar 2024 04:59 )  WBC Count : 27.13 K/uL  RBC Count : 1.43 M/uL  Hemoglobin : 5.0 g/dL  Hematocrit : 14.6 %  Platelet Count - Automated : 129 K/uL  Mean Cell Volume : 102.1 fl  Mean Cell Hemoglobin : 35.0 pg  Mean Cell Hemoglobin Concentration : 34.2 gm/dL  Auto Neutrophil # : 22.41 K/uL  Auto Lymphocyte # : 2.26 K/uL  Auto Monocyte # : 1.83 K/uL  Auto Eosinophil # : 0.30 K/uL  Auto Basophil # : 0.05 K/uL  Auto Neutrophil % : 82.7 %  Auto Lymphocyte % : 8.3 %  Auto Monocyte % : 6.7 %  Auto Eosinophil % : 1.1 %  Auto Basophil % : 0.2 %        130<L>  |  93<L>  |  55<H>  ----------------------------<  109<H>  3.2<L>   |  26  |  0.96    Ca    8.3<L>      30 Mar 2024 04:59    TPro  5.9<L>  /  Alb  2.8<L>  /  TBili  4.3<H>  /  DBili  x   /  AST  197<H>  /  ALT  68  /  AlkPhos  155<H>        LIVER FUNCTIONS - ( 30 Mar 2024 04:59 )  Alb: 2.8 g/dL / Pro: 5.9 g/dL / ALK PHOS: 155 U/L / ALT: 68 U/L / AST: 197 U/L / GGT: x               Urinalysis Basic - ( 30 Mar 2024 09:52 )    Color: Yellow / Appearance: Clear / S.041 / pH: x  Gluc: x / Ketone: Negative mg/dL  / Bili: Negative / Urobili: 1.0 mg/dL   Blood: x / Protein: Negative mg/dL / Nitrite: Negative   Leuk Esterase: Negative / RBC: x / WBC x   Sq Epi: x / Non Sq Epi: x / Bacteria: x          CBC TREND (5 Days)  WBC Count: 27.13 K/uL ( @ 04:59)    Hemoglobin: 5.0 g/dL ( @ 04:59)    Hematocrit: 14.6 % ( @ 04:59)    Platelet Count - Automated: 129 K/uL ( @ 04:59)                                            [MICROBIOLOGY /  VIROLOGY:]          [PATHOLOGY]       [RADIOLOGY & ADDITIONAL STUDIES:]     CT Abdomen and Pelvis w/ IV Cont:   ACC: [id number] EXAM:  CT ABDOMEN AND PELVIS IC   ORDERED BY: JOLIE BANEGAS     PROCEDURE DATE:  2024          INTERPRETATION:  CLINICAL INFORMATION: Abdominal pain.    COMPARISON: None.    CONTRAST/COMPLICATIONS:  IV Contrast: Omnipaque 350  90 cc administered   10 cc discarded  Oral Contrast: NONE  Complications: None reported at time of study completion    PROCEDURE:  CT of the Abdomen and Pelvis was performed.  Sagittal and coronal reformats were performed.    FINDINGS:  LOWER CHEST: No consolidation or pleural effusion. Partially imaged right   breast implant.    LIVER: Markedly enlarged measuring 27.5 cm in length. Cirrhotic   morphology. Heterogeneous containing multiple poorly defined   hypodensities. Consider nonemergent MR to exclude hepatocellular   malignancy or metastasis.  BILE DUCTS: Normal caliber.  GALLBLADDER: Within normal limits.  SPLEEN: Enlarged measuring 15.2 cm in length.  PANCREAS: Within normal limits.  ADRENALS: Within normal limits.  KIDNEYS/URETERS: Within normal limits.    BLADDER: Mild wall thickening, difficult to assess secondary to   inadequate distention.  REPRODUCTIVE ORGANS: Intrauterine device in situ. Suggestion of 1.4 cm   posterior uterine fibroid.    BOWEL: No bowel obstruction.Normal appendix. Diverticulosis coli.  PERITONEUM: Trace pelvic free fluid.  VESSELS: Atherosclerotic changes. Multiple collateral vessels in the   upper abdomen with recanalization of the umbilical vein.  RETROPERITONEUM/LYMPH NODES: No lymphadenopathy.  ABDOMINAL WALL: Cutaneous umbilical ring.  BONES: Mild spondylotic changes of the spine.    IMPRESSION:    Cirrhosis with findings of portal hypertension.    Heterogeneous liver containing multiple poorly defined hypodensities.   Consider nonemergent MR to exclude hepatocellular malignancy or   metastasis.    Hepatosplenomegaly.    Mild bladder wall thickening, difficult to assess secondary to inadequate   distention. Correlate with urinalysis and lab values to assess for   cystitis and/or ascending urinary tract infection.    --- End of Report ---            DELIO MORENO MD; Attending Radiologist  This document has been electronically signed. Mar 30 2024  6:27AM (24 @ 06:11)     Patient is a 53y old  Female who presents with a chief complaint of Anemia/gastroenteritis (30 Mar 2024 08:18)    HPI:  54 yo F, Etoh abuse, Denies PMH presenting for 4 days of nausea and vomiting. Patient states that she had NVD beginning 4 days ago with no inciting factors. States that 2 days following her symptoms, she began to vomit even more, this time with a more brown and bright red color huh. As she vomited more, she began to vomit clots of blood. She also noted to have diarrhea where she said it was more black/brown in nature. She is unclear if there was any bright red blood in her stool. Patient also states that as she began to vomit clots, she began to feel symptoms of palpitations, chills, dizziness. Patient states she came in today because she felt extremely weak. Of note, patient has 35PY history, and drank 8 shots daily for 30 years, with most recent drink 1.5 years ago. In ED, patient was found to have BP in the 70s systolic. HGH of 5.0. Patient currently receiving 1 unit packed blood cells.     In the ED pts VS were T(F): 98 HR: 98 BP: 88/46 RR: 18 SpO2: 97%  Labs show: H.0   WBC: 27  Plt:129  Creatinine: .96 Lactate 4.1 K 3.2     CT A/P:  Cirrhosis with findings of portal hypertension.  Heterogeneous liver containing multiple poorly defined hypodensities.   Consider nonemergent MR to exclude hepatocellular malignancy or metastasis.  Hepatosplenomegaly.  Mild bladder wall thickening, difficult to assess secondary to inadequate distention. Correlate with urinalysis and lab values to assess for cystitis and/or ascending urinary tract infection.    S/p: 1 unit pRBC, Zofran 8mg, 1g tylenol IV, 40mg pantoprazole IV, 1.5L NS  Pt is admitted for further workup and management   (30 Mar 2024 08:18)      PAST MEDICAL & SURGICAL HISTORY:  No pertinent past medical history  No significant past surgical history       HEALTH ISSUES - PROBLEM Dx:  GI bleed  Anemia due to acute blood loss  Gastroenteritis  Abnormal CT scan  Need for prophylactic measure    Abdominal pain [R10.9]  No pertinent past medical history [[id number]]  No significant past surgical history [[id number]]  GIB (gastrointestinal bleeding) [K92.2]  Anemia due to acute blood loss [D62]      FAMILY HISTORY:      [SOCIAL HISTORY: ]     smokinPPD smoker since 16yo     EtOH:  none currently. ex-EtOH 1 pnt rum daily x5-7yrs. Quit      illicit drugs:  none currently. Tried MJ in past, no IVDU.      occupation:  unemployed. Prior worked as ex-assistant for [employer]     marital status:  single. Has 14yo dtr     Other:       [ALLERGIES/INTOLERANCES:]  Allergies     No Known Allergies  Intolerances      [MEDICATIONS]  MEDICATIONS  (STANDING):  acetaminophen   IVPB .. 1000 milliGRAM(s) IV Intermittent once  cefTRIAXone   IVPB      nicotine -   7 mG/24Hr(s) Patch 1 Patch Transdermal daily  octreotide  Infusion 50 MICROgram(s)/Hr (10 mL/Hr) IV Continuous <Continuous>  pantoprazole Infusion 8 mG/Hr (10 mL/Hr) IV Continuous <Continuous>  sucralfate suspension 1 Gram(s) Oral every 6 hours      MEDICATIONS  (PRN):  acetaminophen     Tablet .. 650 milliGRAM(s) Oral every 6 hours PRN Temp greater or equal to 38C (100.4F), Mild Pain (1 - 3)  melatonin 3 milliGRAM(s) Oral at bedtime PRN Insomnia  ondansetron Injectable 4 milliGRAM(s) IV Push every 8 hours PRN Nausea and/or Vomiting  traMADol 25 milliGRAM(s) Oral every 4 hours PRN Moderate Pain (4 - 6)      [REVIEW OF SYSTEMS: ]  CONSTITUTIONAL: +LH, no fever, no shakes, no chills   EYES: No eye pain, no visual disturbances, no discharge  ENMT:  no discharge  NECK: No pain, no stiffness  BREASTS: No pain, no masses, no nipple discharge  RESPIRATORY: No cough, no wheezing, no chills, no hemoptysis; No shortness of breath  CARDIOVASCULAR: No chest pain, no palpitations, no dizziness, no leg swelling  GASTROINTESTINAL: No abdominal, no epigastric pain. No nausea, no vomiting, no hematemesis; No diarrhea , no constipation. +melena, no hematochezia.  GENITOURINARY: No dysuria, no frequency, no hematuria, no incontinence  NEUROLOGICAL: No headaches, no memory loss, no loss of strength, no numbness, no tremors  SKIN: No itching, no burning, no rashes, no lesions   LYMPH NODES: No enlarged glands  ENDOCRINE: No heat or cold intolerance; No hair loss  MUSCULOSKELETAL: No joint pain or swelling; No muscle, no back, no extremity pain  PSYCHIATRIC: No depression, no anxiety, no mood swings, no difficulty sleeping  HEME/LYMPH: No easy bruising, no bleeding gums      [VITALS SIGNS 24hrs]  Vital Signs Last 24 Hrs  T(C): 36.8 (30 Mar 2024 13:55), Max: 36.8 (30 Mar 2024 13:00)  T(F): 98.3 (30 Mar 2024 13:55), Max: 98.3 (30 Mar 2024 13:55)  HR: 92 (30 Mar 2024 13:55) (83 - 102)  BP: 92/55 (30 Mar 2024 13:55) (88/46 - 104/60)  BP(mean): --  RR: 18 (30 Mar 2024 13:00) (16 - 18)  SpO2: 92% (30 Mar 2024 13:55) (92% - 99%)    Parameters below as of 30 Mar 2024 13:55  Patient On (Oxygen Delivery Method): room air    Daily Height in cm: 170.18 (30 Mar 2024 04:31)    Daily     I&O's Summary      [PHYSICAL EXAM]  GEN: WD NAD  HEENT: normocephalic and atraumatic. EOMI.     NECK: Supple.  No lymphadenopathy   LUNGS: Clear to auscultation.  HEART: S1S2 Regular rate and rhythm, no MRG  ABDOMEN: Soft, nontender, and nondistended.  Positive bowel sounds.    : No CVA tenderness  EXTREMITIES: Without edema.  NEUROLOGIC: grossly intact.  PSYCHIATRIC: Appropriate affect .  SKIN: No rash       [LABS: ]                        5.0    27.13 )-----------( 129      ( 30 Mar 2024 04:59 )             14.6     CBC Full  -  ( 30 Mar 2024 04:59 )  WBC Count : 27.13 K/uL  RBC Count : 1.43 M/uL  Hemoglobin : 5.0 g/dL  Hematocrit : 14.6 %  Platelet Count - Automated : 129 K/uL  Mean Cell Volume : 102.1 fl  Mean Cell Hemoglobin : 35.0 pg  Mean Cell Hemoglobin Concentration : 34.2 gm/dL  Auto Neutrophil # : 22.41 K/uL  Auto Lymphocyte # : 2.26 K/uL  Auto Monocyte # : 1.83 K/uL  Auto Eosinophil # : 0.30 K/uL  Auto Basophil # : 0.05 K/uL  Auto Neutrophil % : 82.7 %  Auto Lymphocyte % : 8.3 %  Auto Monocyte % : 6.7 %  Auto Eosinophil % : 1.1 %  Auto Basophil % : 0.2 %      130<L>  |  93<L>  |  55<H>  ----------------------------<  109<H>  3.2<L>   |  26  |  0.96  Ca    8.3<L>      30 Mar 2024 04:59  TPro  5.9<L>  /  Alb  2.8<L>  /  TBili  4.3<H>  /  DBili  x   /  AST  197<H>  /  ALT  68  /  AlkPhos  155<H>      LIVER FUNCTIONS - ( 30 Mar 2024 04:59 )  Alb: 2.8 g/dL / Pro: 5.9 g/dL / ALK PHOS: 155 U/L / ALT: 68 U/L / AST: 197 U/L / GGT: x           Urinalysis Basic - ( 30 Mar 2024 09:52 )  Color: Yellow / Appearance: Clear / S.041 / pH: x  Gluc: x / Ketone: Negative mg/dL  / Bili: Negative / Urobili: 1.0 mg/dL   Blood: x / Protein: Negative mg/dL / Nitrite: Negative   Leuk Esterase: Negative / RBC: x / WBC x   Sq Epi: x / Non Sq Epi: x / Bacteria: x    CBC TREND (5 Days)  WBC Count: 27.13 K/uL ( @ 04:59)  Hemoglobin: 5.0 g/dL ( @ 04:59)  Hematocrit: 14.6 % ( @ 04:59)  Platelet Count - Automated: 129 K/uL ( @ 04:59)        [MICROBIOLOGY /  VIROLOGY:]      [PATHOLOGY]       [RADIOLOGY & ADDITIONAL STUDIES:]     CT Abdomen and Pelvis w/ IV Cont:   ACC: [id number] EXAM:  CT ABDOMEN AND PELVIS IC   ORDERED BY: JOLIE BANEGAS   PROCEDURE DATE:  2024    INTERPRETATION:  CLINICAL INFORMATION: Abdominal pain.  COMPARISON: None.  FINDINGS:  LOWER CHEST: No consolidation or pleural effusion. Partially imaged right breast implant.  LIVER: Markedly enlarged measuring 27.5 cm in length. Cirrhotic morphology. Heterogeneous containing multiple poorly defined   hypodensities. Consider nonemergent MR to exclude hepatocellular malignancy or metastasis.  BILE DUCTS: Normal caliber.  GALLBLADDER: Within normal limits.  SPLEEN: Enlarged measuring 15.2 cm in length.  PANCREAS: Within normal limits.  ADRENALS: Within normal limits.  KIDNEYS/URETERS: Within normal limits.  BLADDER: Mild wall thickening, difficult to assess secondary to inadequate distention.  REPRODUCTIVE ORGANS: Intrauterine device in situ. Suggestion of 1.4 cm posterior uterine fibroid.  BOWEL: No bowel obstruction.Normal appendix. Diverticulosis coli.  PERITONEUM: Trace pelvic free fluid.  VESSELS: Atherosclerotic changes. Multiple collateral vessels in the upper abdomen with recanalization of the umbilical vein.  RETROPERITONEUM/LYMPH NODES: No lymphadenopathy.  ABDOMINAL WALL: Cutaneous umbilical ring.  BONES: Mild spondylotic changes of the spine.    IMPRESSION:  Cirrhosis with findings of portal hypertension.  Heterogeneous liver containing multiple poorly defined hypodensities.   Consider nonemergent MR to exclude hepatocellular malignancy or metastasis.  Hepatosplenomegaly.  Mild bladder wall thickening, difficult to assess secondary to inadequate distention. Correlate with urinalysis and lab values to assess for   cystitis and/or ascending urinary tract infection.  --- End of Report ---  DELIO MORENO MD; Attending Radiologist  This document has been electronically signed. Mar 30 2024  6:27AM (24 @ 06:11)

## 2024-03-30 NOTE — ED PROVIDER NOTE - PROGRESS NOTE DETAILS
Received patient in signout pending ICU evaluation.  ICU declining at this time as patient is responding to blood with a systolic pressure of 101.  Spoke with GI Dr. Kebede  will admit.

## 2024-03-30 NOTE — PATIENT PROFILE ADULT - FALL HARM RISK - RISK INTERVENTIONS

## 2024-03-31 LAB
A1C WITH ESTIMATED AVERAGE GLUCOSE RESULT: 5.3 % — SIGNIFICANT CHANGE UP (ref 4–5.6)
AFP-TM SERPL-MCNC: 4.4 NG/ML — SIGNIFICANT CHANGE UP
AFP-TM SERPL-MCNC: 4.8 NG/ML — SIGNIFICANT CHANGE UP
APTT BLD: 33.2 SEC — SIGNIFICANT CHANGE UP (ref 24.5–35.6)
BASOPHILS # BLD AUTO: 0.06 K/UL — SIGNIFICANT CHANGE UP (ref 0–0.2)
BASOPHILS NFR BLD AUTO: 0.4 % — SIGNIFICANT CHANGE UP (ref 0–2)
CANCER AG125 SERPL-ACNC: 19 U/ML — SIGNIFICANT CHANGE UP
CANCER AG19-9 SERPL-ACNC: 84 U/ML — HIGH
CANCER AG19-9 SERPL-ACNC: 92 U/ML — HIGH
CEA SERPL-MCNC: 4.6 NG/ML — HIGH (ref 0–3.8)
CHOLEST SERPL-MCNC: 100 MG/DL — SIGNIFICANT CHANGE UP
CULTURE RESULTS: NO GROWTH — SIGNIFICANT CHANGE UP
EOSINOPHIL # BLD AUTO: 0.32 K/UL — SIGNIFICANT CHANGE UP (ref 0–0.5)
EOSINOPHIL NFR BLD AUTO: 2.2 % — SIGNIFICANT CHANGE UP (ref 0–6)
ESTIMATED AVERAGE GLUCOSE: 105 MG/DL — SIGNIFICANT CHANGE UP (ref 68–114)
FOLATE RBC-MCNC: 1416 NG/ML — SIGNIFICANT CHANGE UP (ref 499–1504)
FOLATE SERPL-MCNC: 3.1 NG/ML — LOW
HAV IGM SER-ACNC: SIGNIFICANT CHANGE UP
HBV CORE IGM SER-ACNC: SIGNIFICANT CHANGE UP
HBV SURFACE AG SER-ACNC: SIGNIFICANT CHANGE UP
HCT VFR BLD CALC: 19.7 % — CRITICAL LOW (ref 34.5–45)
HCT VFR BLD CALC: 20.5 % — CRITICAL LOW (ref 34.5–45)
HCT VFR BLD CALC: 21.3 % — LOW (ref 34.5–45)
HCT VFR BLD CALC: 24 % — LOW (ref 34.5–45)
HCV AB S/CO SERPL IA: 0.06 S/CO — SIGNIFICANT CHANGE UP (ref 0–0.99)
HCV AB SERPL-IMP: SIGNIFICANT CHANGE UP
HDLC SERPL-MCNC: 17 MG/DL — LOW
HGB BLD-MCNC: 7.1 G/DL — LOW (ref 11.5–15.5)
HGB BLD-MCNC: 7.3 G/DL — LOW (ref 11.5–15.5)
HGB BLD-MCNC: 8.4 G/DL — LOW (ref 11.5–15.5)
IMM GRANULOCYTES NFR BLD AUTO: 0.8 % — SIGNIFICANT CHANGE UP (ref 0–0.9)
INR BLD: 1.56 RATIO — HIGH (ref 0.85–1.18)
IRON SATN MFR SERPL: 125 UG/DL — SIGNIFICANT CHANGE UP (ref 30–160)
IRON SATN MFR SERPL: 67 % — HIGH (ref 14–50)
LIPID PNL WITH DIRECT LDL SERPL: 56 MG/DL — SIGNIFICANT CHANGE UP
LYMPHOCYTES # BLD AUTO: 0.99 K/UL — LOW (ref 1–3.3)
LYMPHOCYTES # BLD AUTO: 6.8 % — LOW (ref 13–44)
MAGNESIUM SERPL-MCNC: 1.7 MG/DL — SIGNIFICANT CHANGE UP (ref 1.6–2.6)
MCHC RBC-ENTMCNC: 30.9 PG — SIGNIFICANT CHANGE UP (ref 27–34)
MCHC RBC-ENTMCNC: 31.4 PG — SIGNIFICANT CHANGE UP (ref 27–34)
MCHC RBC-ENTMCNC: 34.3 GM/DL — SIGNIFICANT CHANGE UP (ref 32–36)
MCHC RBC-ENTMCNC: 34.6 GM/DL — SIGNIFICANT CHANGE UP (ref 32–36)
MCV RBC AUTO: 90.3 FL — SIGNIFICANT CHANGE UP (ref 80–100)
MCV RBC AUTO: 90.7 FL — SIGNIFICANT CHANGE UP (ref 80–100)
MONOCYTES # BLD AUTO: 0.82 K/UL — SIGNIFICANT CHANGE UP (ref 0–0.9)
MONOCYTES NFR BLD AUTO: 5.6 % — SIGNIFICANT CHANGE UP (ref 2–14)
NEUTROPHILS # BLD AUTO: 12.25 K/UL — HIGH (ref 1.8–7.4)
NEUTROPHILS NFR BLD AUTO: 84.2 % — HIGH (ref 43–77)
NON HDL CHOLESTEROL: 83 MG/DL — SIGNIFICANT CHANGE UP
NRBC # BLD: 0 /100 WBCS — SIGNIFICANT CHANGE UP (ref 0–0)
NRBC # BLD: 0 /100 WBCS — SIGNIFICANT CHANGE UP (ref 0–0)
PHOSPHATE SERPL-MCNC: 2.2 MG/DL — LOW (ref 2.5–4.5)
PLATELET # BLD AUTO: 101 K/UL — LOW (ref 150–400)
PLATELET # BLD AUTO: 107 K/UL — LOW (ref 150–400)
PROTHROM AB SERPL-ACNC: 18 SEC — HIGH (ref 9.5–13)
RBC # BLD: 2.26 M/UL — LOW (ref 3.8–5.2)
RBC # BLD: 2.36 M/UL — LOW (ref 3.8–5.2)
RBC # FLD: 21.7 % — HIGH (ref 10.3–14.5)
RBC # FLD: 22.2 % — HIGH (ref 10.3–14.5)
SPECIMEN SOURCE: SIGNIFICANT CHANGE UP
TIBC SERPL-MCNC: 186 UG/DL — LOW (ref 220–430)
TRANSFERRIN SERPL-MCNC: 143 MG/DL — LOW (ref 200–360)
TRIGL SERPL-MCNC: 157 MG/DL — HIGH
UIBC SERPL-MCNC: 61 UG/DL — LOW (ref 110–370)
VIT B12 SERPL-MCNC: 1121 PG/ML — SIGNIFICANT CHANGE UP (ref 232–1245)
WBC # BLD: 14.56 K/UL — HIGH (ref 3.8–10.5)
WBC # BLD: 15.53 K/UL — HIGH (ref 3.8–10.5)
WBC # FLD AUTO: 14.56 K/UL — HIGH (ref 3.8–10.5)
WBC # FLD AUTO: 15.53 K/UL — HIGH (ref 3.8–10.5)

## 2024-03-31 PROCEDURE — 93306 TTE W/DOPPLER COMPLETE: CPT | Mod: 26

## 2024-03-31 PROCEDURE — 74183 MRI ABD W/O CNTR FLWD CNTR: CPT | Mod: 26

## 2024-03-31 PROCEDURE — 99222 1ST HOSP IP/OBS MODERATE 55: CPT

## 2024-03-31 PROCEDURE — 99233 SBSQ HOSP IP/OBS HIGH 50: CPT | Mod: GC

## 2024-03-31 RX ORDER — FOLIC ACID 0.8 MG
5 TABLET ORAL ONCE
Refills: 0 | Status: COMPLETED | OUTPATIENT
Start: 2024-03-31 | End: 2024-03-31

## 2024-03-31 RX ORDER — FOLIC ACID 0.8 MG
1 TABLET ORAL DAILY
Refills: 0 | Status: DISCONTINUED | OUTPATIENT
Start: 2024-04-01 | End: 2024-04-03

## 2024-03-31 RX ORDER — FLUTICASONE PROPIONATE 50 MCG
1 SPRAY, SUSPENSION NASAL
Refills: 0 | Status: DISCONTINUED | OUTPATIENT
Start: 2024-03-31 | End: 2024-04-03

## 2024-03-31 RX ORDER — MAGNESIUM SULFATE 500 MG/ML
1 VIAL (ML) INJECTION ONCE
Refills: 0 | Status: COMPLETED | OUTPATIENT
Start: 2024-03-31 | End: 2024-03-31

## 2024-03-31 RX ADMIN — Medication 5 MILLIGRAM(S): at 17:46

## 2024-03-31 RX ADMIN — Medication 1 GRAM(S): at 17:46

## 2024-03-31 RX ADMIN — Medication 1 GRAM(S): at 12:39

## 2024-03-31 RX ADMIN — CEFTRIAXONE 100 MILLIGRAM(S): 500 INJECTION, POWDER, FOR SOLUTION INTRAMUSCULAR; INTRAVENOUS at 08:49

## 2024-03-31 RX ADMIN — OCTREOTIDE ACETATE 10 MICROGRAM(S)/HR: 200 INJECTION, SOLUTION INTRAVENOUS; SUBCUTANEOUS at 06:13

## 2024-03-31 RX ADMIN — OCTREOTIDE ACETATE 10 MICROGRAM(S)/HR: 200 INJECTION, SOLUTION INTRAVENOUS; SUBCUTANEOUS at 17:57

## 2024-03-31 RX ADMIN — Medication 3 MILLIGRAM(S): at 22:10

## 2024-03-31 RX ADMIN — Medication 1 SPRAY(S): at 17:46

## 2024-03-31 RX ADMIN — Medication 1 PATCH: at 05:53

## 2024-03-31 RX ADMIN — Medication 1 GRAM(S): at 22:14

## 2024-03-31 RX ADMIN — Medication 1 DROP(S): at 00:50

## 2024-03-31 RX ADMIN — Medication 1 GRAM(S): at 06:13

## 2024-03-31 RX ADMIN — Medication 1 SPRAY(S): at 22:11

## 2024-03-31 RX ADMIN — Medication 1 PATCH: at 19:53

## 2024-03-31 RX ADMIN — Medication 1 PATCH: at 12:39

## 2024-03-31 RX ADMIN — Medication 100 GRAM(S): at 22:10

## 2024-03-31 RX ADMIN — PANTOPRAZOLE SODIUM 10 MG/HR: 20 TABLET, DELAYED RELEASE ORAL at 06:13

## 2024-03-31 NOTE — PROGRESS NOTE ADULT - SUBJECTIVE AND OBJECTIVE BOX
[INTERVAL HX: ]  Patient seen and examined;  Chart reviewed and events noted;     getting transfusion  no CP, no SOB    no further hematemesis  on octreotide infusion.     [MEDICATIONS]  MEDICATIONS  (STANDING):  acetaminophen   IVPB .. 1000 milliGRAM(s) IV Intermittent once  fluticasone propionate 50 MICROgram(s)/spray Nasal Spray 1 Spray(s) Both Nostrils two times a day  folic acid 1 milliGRAM(s) Oral daily  folic acid Injectable 5 milliGRAM(s) SubCutaneous once  magnesium sulfate  IVPB 1 Gram(s) IV Intermittent once  nicotine -   7 mG/24Hr(s) Patch 1 Patch Transdermal daily  octreotide  Infusion 50 MICROgram(s)/Hr (10 mL/Hr) IV Continuous <Continuous>  pantoprazole Infusion 8 mG/Hr (10 mL/Hr) IV Continuous <Continuous>  sucralfate suspension 1 Gram(s) Oral every 6 hours    MEDICATIONS  (PRN):  acetaminophen     Tablet .. 650 milliGRAM(s) Oral every 6 hours PRN Temp greater or equal to 38C (100.4F), Mild Pain (1 - 3)  artificial  tears Solution 1 Drop(s) Both EYES two times a day PRN Dry Eyes  melatonin 3 milliGRAM(s) Oral at bedtime PRN Insomnia  ondansetron Injectable 4 milliGRAM(s) IV Push every 8 hours PRN Nausea and/or Vomiting  traMADol 25 milliGRAM(s) Oral every 4 hours PRN Moderate Pain (4 - 6)      [VITALS]  Vital Signs Last 24 Hrs  T(C): 37 (31 Mar 2024 14:40), Max: 37.3 (30 Mar 2024 22:36)  T(F): 98.6 (31 Mar 2024 14:40), Max: 99.1 (30 Mar 2024 22:36)  HR: 84 (31 Mar 2024 14:40) (84 - 95)  BP: 95/54 (31 Mar 2024 14:40) (90/53 - 108/62)  BP(mean): --  RR: 18 (31 Mar 2024 14:40) (17 - 18)  SpO2: 93% (31 Mar 2024 14:40) (91% - 93%)    Parameters below as of 31 Mar 2024 14:40  Patient On (Oxygen Delivery Method): room air      [WT/HT]  Daily     Daily   [VENT]      [PHYSICAL EXAM]  GEN: NAD  HEENT: normocephalic and atraumatic. EOMI. PERRL.    NECK: Supple.  No lymphadenopathy   LUNGS: Clear to auscultation.  HEART: Regular rate and rhythm,  no MRG  ABDOMEN: Soft, nontender, and nondistended.  Positive bowel sounds.    : No CVA tenderness  EXTREMITIES: Without edema.  NEUROLOGIC: grossly intact.  PSYCHIATRIC: Appropriate affect .  SKIN: No rash     [LABS:]                        7.1    14.56 )-----------( 107      ( 31 Mar 2024 06:25 )             20.5     03-30    134<L>  |  100  |  45<H>  ----------------------------<  117<H>  3.8   |  23  |  0.73    Ca    7.2<L>      30 Mar 2024 16:15  Phos  2.2     03-31  Mg     1.7     03-31    TPro  5.6<L>  /  Alb  2.6<L>  /  TBili  5.7<H>  /  DBili  x   /  AST  216<H>  /  ALT  67  /  AlkPhos  142<H>  03-30    PT/INR - ( 31 Mar 2024 06:25 )   PT: 18.0 sec;   INR: 1.56 ratio         PTT - ( 31 Mar 2024 06:25 )  PTT:33.2 sec      Vitamin B12, Serum: 1121 pg/mL [232 - 1245] (03-31-24 @ 06:25)    Folate, Serum: 3.1 ng/mL *L* (03-31-24 @ 06:25)    Reticulocyte Count (03-30-24 @ 16:15)  Reticulocyte Percent: 3.9 % *H* [0.5 - 2.5]  Absolute Reticulocytes: 80.7 K/uL [25.0 - 125.0]    C-Reactive Protein, Serum: 54 mg/L (03.30.24 @ 16:15)   Sedimentation Rate, Erythrocyte: 42 mm/hr *H* [0 - 20] (03-30-24 @ 16:15)      Folate, RBC: 1416 ng/mL [499 - 1504] (03-30-24 @ 16:15)    Ferritin: 1237 ng/mL *H* [13 - 330] (03-30-24 @ 16:15)      Alpha Fetoprotein - Tumor Marker: 4.4: Method: Roche Electrochemiluminescence Immuno Assay Values obtained with different assay methods or kits cannot be used interchangeably.   Results cannot be interpreted as absolute evidence of the presence or absence of malignant disease.   AFP values are not interpretable in pregnant females. ng/mL (03.31.24 @ 06:25)     r    Urinalysis Basic - ( 30 Mar 2024 16:15 )  Color: x / Appearance: x / SG: x / pH: x  Gluc: 117 mg/dL / Ketone: x  / Bili: x / Urobili: x   Blood: x / Protein: x / Nitrite: x   Leuk Esterase: x / RBC: x / WBC x   Sq Epi: x / Non Sq Epi: x / Bacteria: x        Culture - Urine (collected 30 Mar 2024 09:52)  Source: Clean Catch Clean Catch (Midstream)  Final Report (31 Mar 2024 12:32):    No growth    Culture - Blood (collected 30 Mar 2024 06:10)  Source: .Blood Blood-Peripheral  Preliminary Report (31 Mar 2024 13:02):    No growth at 24 hours    Culture - Blood (collected 30 Mar 2024 06:05)  Source: .Blood Blood-Peripheral  Preliminary Report (31 Mar 2024 13:02):    No growth at 24 hours            Culture - Urine (collected 30 Mar 2024 09:52)  Source: Clean Catch Clean Catch (Midstream)  Final Report (31 Mar 2024 12:32):    No growth    Culture - Blood (collected 30 Mar 2024 06:10)  Source: .Blood Blood-Peripheral  Preliminary Report (31 Mar 2024 13:02):    No growth at 24 hours    Culture - Blood (collected 30 Mar 2024 06:05)  Source: .Blood Blood-Peripheral  Preliminary Report (31 Mar 2024 13:02):    No growth at 24 hours        [RADIOLOGY STUDIES:]

## 2024-03-31 NOTE — PROGRESS NOTE ADULT - ASSESSMENT
[ASSESSMENT and  PLAN]  K70. 30   EtOH Cirrhosis  K92.2      GIB  D62         Aneia hemorrhage  D50.0      Fe def anemia   D69.6      Thrombocytopenia due to cirrhosis  K92.0      Hematemesis.  I85. 01     Esophageal varices with bleeding  K76.6      Portal hypertension.  K92.1      Melena  R16.0      Liver masses    52yo F with hx of EtOH abuse for 5-7yrs, presenting with GIB sx and symptomatic anemia  Hematemesis, suspected variceal bleeding.  Melena due to UGIB  Symptomatic anemia, requiring PRBC txfusion.   Hx Fe def anemia    Pt of [place], [place] and English ancestry.   Denies history of hemochromatosis  Denies hx of hepatitis    Anemia due to hemorrhage  Anemia due to chronic disease  possible anemia due to Fe def, hx of per pt  Thrombocytopenia due to cirrhosis  Some concern for hemochromatosis due to ancestry and cirrhosis, age.     Liver with abn findings  ?liver masses  Cirrhosis with findings of portal hypertension.  Heterogeneous liver containing multiple poorly defined hypodensities.   Consider nonemergent MR w and wo contrast to eval for hepatocellular malignancy or metastasis and iron overload.     Elevated ESR and CRP  cannot determine if elevated ferritin baseline or due to inflammation.     RECOMMENDATIONS    GIB / Anemia  Transfuse PRBC as clinically indicated.   Transfuse PRBC if Hgb <7.0 or if symptomatic.   Follow CBC    Check Anemia studies.      Ferritin 1237, Iron studies pending     B12   1121,  Folate 3.1     ESR 42, CRP 54  Consider hepatitis serologies  Check HFE gene mutation; ordered for tomorrow  D/w Pt.   Consent signed; in physical chart    GI eval   Dr Gutierrez saw today.   tentative EGD on Mon    Liver Mass?  recommend MRI abd when stable.  AFP 4.4 impressive  check CEA    DVT Prophylaxis  SQ LMWH or Heparin Contraindicated  OOB as samia  SCD if warranted    Discussed plan of care with patient in detail.   Pt expressed understanding of the treatment plan.   Risks, benefits and alternatives discussed in detail.   Opportunity given for questions and discussion.   Questions or concerns all addressed and answered to their satisfaction, and in lay terms.     Thank you for consulting us.

## 2024-03-31 NOTE — PROGRESS NOTE ADULT - PROBLEM SELECTOR PLAN 2
Blood loss 2/2   hgh 5.0 on admission  s/p 1.5 L fluid  - s/p 3 Units in ED  - repeat Hgb shows 7.1  - 1 u pRBC + plasma ordered for 3/31, repeat H+H at 5PM  - maintain Hgb>7  - Maintain active type and screen  - f/u Hemochromatosis gene mutation lab, elevated ferritin  - Heme onc consulted Blood loss 2/2 suspected UGIB  hgh 5.0 on admission  s/p 1.5 L fluid  - s/p 3 Units in ED  - repeat Hgb shows 7.1  - 1 u pRBC + plasma ordered for 3/31, repeat H+H at 5PM  - maintain Hgb>7  - Maintain active type and screen  - f/u Hemochromatosis gene mutation lab, elevated ferritin  - Heme onc consulted

## 2024-03-31 NOTE — PROGRESS NOTE ADULT - SUBJECTIVE AND OBJECTIVE BOX
Patient is a 53y old  Female who presents with a chief complaint of Anemia/gastroenteritis (31 Mar 2024 10:27)      INTERVAL HPI/OVERNIGHT EVENTS:  Patient recently admitted  States her vomitting, nausea has resolved  No repeat episode of acute bleeding  Has not been ambulating much, only to bathroom  Aware of plan, did not have any questions during visit    MEDICATIONS  (STANDING):  acetaminophen   IVPB .. 1000 milliGRAM(s) IV Intermittent once  cefTRIAXone   IVPB 1000 milliGRAM(s) IV Intermittent every 24 hours  cefTRIAXone   IVPB      nicotine -   7 mG/24Hr(s) Patch 1 Patch Transdermal daily  octreotide  Infusion 50 MICROgram(s)/Hr (10 mL/Hr) IV Continuous <Continuous>  pantoprazole Infusion 8 mG/Hr (10 mL/Hr) IV Continuous <Continuous>  sucralfate suspension 1 Gram(s) Oral every 6 hours    MEDICATIONS  (PRN):  acetaminophen     Tablet .. 650 milliGRAM(s) Oral every 6 hours PRN Temp greater or equal to 38C (100.4F), Mild Pain (1 - 3)  artificial  tears Solution 1 Drop(s) Both EYES two times a day PRN Dry Eyes  melatonin 3 milliGRAM(s) Oral at bedtime PRN Insomnia  ondansetron Injectable 4 milliGRAM(s) IV Push every 8 hours PRN Nausea and/or Vomiting  traMADol 25 milliGRAM(s) Oral every 4 hours PRN Moderate Pain (4 - 6)      Allergies    No Known Allergies    Intolerances        REVIEW OF SYSTEMS:  CONSTITUTIONAL: No fever or chills  HEENT:  No headache, no sore throat  RESPIRATORY: No cough, wheezing, or shortness of breath  CARDIOVASCULAR: No chest pain, palpitations  GASTROINTESTINAL: No abd pain, nausea, vomiting, or diarrhea  GENITOURINARY: No dysuria, frequency, or hematuria  NEUROLOGICAL: no focal weakness or dizziness  MUSCULOSKELETAL: no myalgias       Vital Signs Last 24 Hrs  T(C): 36.8 (31 Mar 2024 05:17), Max: 37.3 (30 Mar 2024 22:36)  T(F): 98.3 (31 Mar 2024 05:17), Max: 99.1 (30 Mar 2024 22:36)  HR: 89 (31 Mar 2024 05:17) (89 - 95)  BP: 90/53 (31 Mar 2024 05:17) (90/53 - 108/62)  BP(mean): --  RR: 18 (31 Mar 2024 05:17) (17 - 18)  SpO2: 93% (31 Mar 2024 05:17) (91% - 97%)    Parameters below as of 31 Mar 2024 05:17  Patient On (Oxygen Delivery Method): room air        PHYSICAL EXAM:  GENERAL: NAD  HEENT:  anicteric, moist mucous membranes  CHEST/LUNG:  CTA b/l, no rales, wheezes, or rhonchi  HEART:  RRR, S1, S2  ABDOMEN:   soft, nontender, nondistended, no splenomegaly or hepatomegaly appreciated  MUSCULOSKELETAL: no edema, cyanosis, or calf tenderness  NEUROLOGIC: answers questions and follows commands appropriately      LABS:                        7.1    14.56 )-----------( 107      ( 31 Mar 2024 06:25 )             20.5     CBC Full  -  ( 31 Mar 2024 06:25 )  WBC Count : 14.56 K/uL  Hemoglobin : 7.1 g/dL  Hematocrit : 20.5 %  Platelet Count - Automated : 107 K/uL  Mean Cell Volume : 90.7 fl  Mean Cell Hemoglobin : 31.4 pg  Mean Cell Hemoglobin Concentration : 34.6 gm/dL  Auto Neutrophil # : 12.25 K/uL  Auto Lymphocyte # : 0.99 K/uL  Auto Monocyte # : 0.82 K/uL  Auto Eosinophil # : 0.32 K/uL  Auto Basophil # : 0.06 K/uL  Auto Neutrophil % : 84.2 %  Auto Lymphocyte % : 6.8 %  Auto Monocyte % : 5.6 %  Auto Eosinophil % : 2.2 %  Auto Basophil % : 0.4 %    30 Mar 2024 16:15    134    |  100    |  45     ----------------------------<  117    3.8     |  23     |  0.73     Ca    7.2        30 Mar 2024 16:15  Phos  2.2       31 Mar 2024 06:25  Mg     1.7       31 Mar 2024 06:25    TPro  5.6    /  Alb  2.6    /  TBili  5.7    /  DBili  x      /  AST  216    /  ALT  67     /  AlkPhos  142    30 Mar 2024 16:15    PT/INR - ( 31 Mar 2024 06:25 )   PT: 18.0 sec;   INR: 1.56 ratio         PTT - ( 31 Mar 2024 06:25 )  PTT:33.2 sec  Urinalysis Basic - ( 30 Mar 2024 16:15 )    Color: x / Appearance: x / SG: x / pH: x  Gluc: 117 mg/dL / Ketone: x  / Bili: x / Urobili: x   Blood: x / Protein: x / Nitrite: x   Leuk Esterase: x / RBC: x / WBC x   Sq Epi: x / Non Sq Epi: x / Bacteria: x      CAPILLARY BLOOD GLUCOSE            Culture - Urine (collected 03-30-24 @ 09:52)  Source: Clean Catch Clean Catch (Midstream)  Final Report (03-31-24 @ 12:32):    No growth        RADIOLOGY & ADDITIONAL TESTS:    Personally reviewed.     Consultant(s) Notes Reviewed:  [x] YES  [ ] NO     Patient is a 53y old  Female who presents with a chief complaint of Anemia/gastroenteritis (31 Mar 2024 10:27)      INTERVAL HPI/OVERNIGHT EVENTS:  Patient recently admitted  BP has been trending on low side - states her symptoms of orthostasis much improved  States her vomiting nausea has resolved  No repeat episode of acute bleeding  Has not been ambulating much, only to bathroom  Aware of plan, did not have any questions during visit    MEDICATIONS  (STANDING):  acetaminophen   IVPB .. 1000 milliGRAM(s) IV Intermittent once  cefTRIAXone   IVPB 1000 milliGRAM(s) IV Intermittent every 24 hours  cefTRIAXone   IVPB      nicotine -   7 mG/24Hr(s) Patch 1 Patch Transdermal daily  octreotide  Infusion 50 MICROgram(s)/Hr (10 mL/Hr) IV Continuous <Continuous>  pantoprazole Infusion 8 mG/Hr (10 mL/Hr) IV Continuous <Continuous>  sucralfate suspension 1 Gram(s) Oral every 6 hours    MEDICATIONS  (PRN):  acetaminophen     Tablet .. 650 milliGRAM(s) Oral every 6 hours PRN Temp greater or equal to 38C (100.4F), Mild Pain (1 - 3)  artificial  tears Solution 1 Drop(s) Both EYES two times a day PRN Dry Eyes  melatonin 3 milliGRAM(s) Oral at bedtime PRN Insomnia  ondansetron Injectable 4 milliGRAM(s) IV Push every 8 hours PRN Nausea and/or Vomiting  traMADol 25 milliGRAM(s) Oral every 4 hours PRN Moderate Pain (4 - 6)      Allergies    No Known Allergies    Intolerances        REVIEW OF SYSTEMS:  CONSTITUTIONAL: No fever or chills  HEENT:  No headache, no sore throat  RESPIRATORY: No cough, wheezing, or shortness of breath  CARDIOVASCULAR: No chest pain, palpitations  GASTROINTESTINAL: No abd pain, nausea, vomiting, or diarrhea  GENITOURINARY: No dysuria, frequency, or hematuria  NEUROLOGICAL: no focal weakness; +improving dizziness  MUSCULOSKELETAL: no myalgias       Vital Signs Last 24 Hrs  T(C): 36.8 (31 Mar 2024 05:17), Max: 37.3 (30 Mar 2024 22:36)  T(F): 98.3 (31 Mar 2024 05:17), Max: 99.1 (30 Mar 2024 22:36)  HR: 89 (31 Mar 2024 05:17) (89 - 95)  BP: 90/53 (31 Mar 2024 05:17) (90/53 - 108/62)  RR: 18 (31 Mar 2024 05:17) (17 - 18)  SpO2: 93% (31 Mar 2024 05:17) (91% - 97%)    Parameters below as of 31 Mar 2024 05:17  Patient On (Oxygen Delivery Method): room air    PHYSICAL EXAM:  GENERAL: NAD  HEENT:  scleral icterus noted, moist mucous membranes  CHEST/LUNG:  CTA b/l, no rales, wheezes, or rhonchi  HEART:  RRR, S1, S2  ABDOMEN:   soft, nontender, nondistended, no splenomegaly or hepatomegaly appreciated  MUSCULOSKELETAL: no edema, cyanosis, or calf tenderness  NEUROLOGIC: answers questions and follows commands appropriately  PSYCH: normal affedct    LABS:                        7.1    14.56 )-----------( 107      ( 31 Mar 2024 06:25 )             20.5     CBC Full  -  ( 31 Mar 2024 06:25 )  WBC Count : 14.56 K/uL  Hemoglobin : 7.1 g/dL  Hematocrit : 20.5 %  Platelet Count - Automated : 107 K/uL  Mean Cell Volume : 90.7 fl  Mean Cell Hemoglobin : 31.4 pg  Mean Cell Hemoglobin Concentration : 34.6 gm/dL  Auto Neutrophil # : 12.25 K/uL  Auto Lymphocyte # : 0.99 K/uL  Auto Monocyte # : 0.82 K/uL  Auto Eosinophil # : 0.32 K/uL  Auto Basophil # : 0.06 K/uL  Auto Neutrophil % : 84.2 %  Auto Lymphocyte % : 6.8 %  Auto Monocyte % : 5.6 %  Auto Eosinophil % : 2.2 %  Auto Basophil % : 0.4 %    30 Mar 2024 16:15    134    |  100    |  45     ----------------------------<  117    3.8     |  23     |  0.73     Ca    7.2        30 Mar 2024 16:15  Phos  2.2       31 Mar 2024 06:25  Mg     1.7       31 Mar 2024 06:25    TPro  5.6    /  Alb  2.6    /  TBili  5.7    /  DBili  x      /  AST  216    /  ALT  67     /  AlkPhos  142    30 Mar 2024 16:15    PT/INR - ( 31 Mar 2024 06:25 )   PT: 18.0 sec;   INR: 1.56 ratio         PTT - ( 31 Mar 2024 06:25 )  PTT:33.2 sec  Urinalysis Basic - ( 30 Mar 2024 16:15 )    Color: x / Appearance: x / SG: x / pH: x  Gluc: 117 mg/dL / Ketone: x  / Bili: x / Urobili: x   Blood: x / Protein: x / Nitrite: x   Leuk Esterase: x / RBC: x / WBC x   Sq Epi: x / Non Sq Epi: x / Bacteria: x      CAPILLARY BLOOD GLUCOSE            Culture - Urine (collected 03-30-24 @ 09:52)  Source: Clean Catch Clean Catch (Midstream)  Final Report (03-31-24 @ 12:32):    No growth        RADIOLOGY & ADDITIONAL TESTS:    Personally reviewed.     Consultant(s) Notes Reviewed:  [x] YES  [ ] NO

## 2024-03-31 NOTE — PROGRESS NOTE ADULT - SUBJECTIVE AND OBJECTIVE BOX
INTERVAL HPI/OVERNIGHT EVENTS:  Pt seen and examined   Reports some abd pian but improved and is feeling better today  Reports feeling ok to eat today   FOBT negative        MEDICATIONS  (STANDING):  acetaminophen   IVPB .. 1000 milliGRAM(s) IV Intermittent once  cefTRIAXone   IVPB      cefTRIAXone   IVPB 1000 milliGRAM(s) IV Intermittent every 24 hours  nicotine -   7 mG/24Hr(s) Patch 1 Patch Transdermal daily  octreotide  Infusion 50 MICROgram(s)/Hr (10 mL/Hr) IV Continuous <Continuous>  pantoprazole Infusion 8 mG/Hr (10 mL/Hr) IV Continuous <Continuous>  sucralfate suspension 1 Gram(s) Oral every 6 hours    MEDICATIONS  (PRN):  acetaminophen     Tablet .. 650 milliGRAM(s) Oral every 6 hours PRN Temp greater or equal to 38C (100.4F), Mild Pain (1 - 3)  artificial  tears Solution 1 Drop(s) Both EYES two times a day PRN Dry Eyes  melatonin 3 milliGRAM(s) Oral at bedtime PRN Insomnia  ondansetron Injectable 4 milliGRAM(s) IV Push every 8 hours PRN Nausea and/or Vomiting  traMADol 25 milliGRAM(s) Oral every 4 hours PRN Moderate Pain (4 - 6)      Allergies  No Known Allergies    Intolerances    REVIEW OF SYSTEMS:  CONSTITUTIONAL: No fever or chills, +weakness  HEENT:  No headache, no sore throat  RESPIRATORY: No cough, wheezing, or shortness of breath  CARDIOVASCULAR: No chest pain, palpitations, or leg swelling  GASTROINTESTINAL: + abd pain, nausea, vomiting, or diarrhea  GENITOURINARY: No dysuria, frequency, or hematuria  NEUROLOGICAL: no focal weakness or dizziness  MUSCULOSKELETAL: no myalgias       Vital Signs Last 24 Hrs  T(C): 36.8 (31 Mar 2024 05:17), Max: 37.3 (30 Mar 2024 22:36)  T(F): 98.3 (31 Mar 2024 05:17), Max: 99.1 (30 Mar 2024 22:36)  HR: 89 (31 Mar 2024 05:17) (89 - 95)  BP: 90/53 (31 Mar 2024 05:17) (90/53 - 108/62)  BP(mean): --  RR: 18 (31 Mar 2024 05:17) (17 - 18)  SpO2: 93% (31 Mar 2024 05:17) (91% - 97%)    Parameters below as of 31 Mar 2024 05:17  Patient On (Oxygen Delivery Method): room air      PHYSICAL EXAM:  GENERAL: NAD  HEENT:  NC/AT, EOMI, moist mucous membranes  CHEST/LUNG:  CTA b/l, no rales, wheezes, or rhonchi  HEART:  RRR, S1, S2  ABDOMEN:  BS+, soft, nontender, nondistended  EXTREMITIES: no edema, cyanosis, or calf tenderness  NERVOUS SYSTEM: AA&Ox3        LABS:                        7.1    14.56 )-----------( 107      ( 31 Mar 2024 06:25 )             20.5     03-30    134<L>  |  100  |  45<H>  ----------------------------<  117<H>  3.8   |  23  |  0.73    Ca    7.2<L>      30 Mar 2024 16:15  Phos  2.2     03-31  Mg     1.7     03-31    TPro  5.6<L>  /  Alb  2.6<L>  /  TBili  5.7<H>  /  DBili  x   /  AST  216<H>  /  ALT  67  /  AlkPhos  142<H>  03-30    PT/INR - ( 31 Mar 2024 06:25 )   PT: 18.0 sec;   INR: 1.56 ratio         PTT - ( 31 Mar 2024 06:25 )  PTT:33.2 sec      03-30-24 @ 07:01  -  03-31-24 @ 07:00  --------------------------------------------------------  IN: 240 mL / OUT: 0 mL / NET: 240 mL          RADIOLOGY :   ACC: [id number] EXAM:  CT ABDOMEN AND PELVIS IC   ORDERED BY: JOLIE BANEGAS     PROCEDURE DATE:  03/30/2024          INTERPRETATION:  CLINICAL INFORMATION: Abdominal pain.    COMPARISON: None.    CONTRAST/COMPLICATIONS:  IV Contrast: Omnipaque 350  90 cc administered   10 cc discarded  Oral Contrast: NONE  Complications: None reported at time of study completion    PROCEDURE:  CT of the Abdomen and Pelvis was performed.  Sagittal and coronal reformats were performed.    FINDINGS:  LOWER CHEST: No consolidation or pleural effusion. Partially imaged right   breast implant.    LIVER: Markedly enlarged measuring 27.5 cm in length. Cirrhotic   morphology. Heterogeneous containing multiple poorly defined   hypodensities. Consider nonemergent MR to exclude hepatocellular   malignancy or metastasis.  BILE DUCTS: Normal caliber.  GALLBLADDER: Within normal limits.  SPLEEN: Enlarged measuring 15.2 cm in length.  PANCREAS: Within normal limits.  ADRENALS: Within normal limits.  KIDNEYS/URETERS: Within normal limits.    BLADDER: Mild wall thickening, difficult to assess secondary to   inadequate distention.  REPRODUCTIVE ORGANS: Intrauterine device in situ. Suggestion of 1.4 cm   posterior uterine fibroid.    BOWEL: No bowel obstruction. Normal appendix. Diverticulosis coli.  PERITONEUM: Trace pelvic free fluid.  VESSELS: Atherosclerotic changes. Multiple collateral vessels in the   upper abdomen with recanalization of the umbilical vein.  RETROPERITONEUM/LYMPH NODES: No lymphadenopathy.  ABDOMINAL WALL: Cutaneous umbilical ring.  BONES: Mild spondylotic changes of the spine.    IMPRESSION:    Cirrhosis with findings of portal hypertension.    Heterogeneous liver containing multiple poorly defined hypodensities.   Consider nonemergent MR to exclude hepatocellular malignancy or   metastasis.    Hepatosplenomegaly.    Mild bladder wall thickening, difficult to assess secondary to inadequate   distention. Correlate with urinalysis and lab values to assess for   cystitis and/or ascending urinary tract infection.

## 2024-03-31 NOTE — CONSULT NOTE ADULT - ASSESSMENT
53y  Female with h/o  Etoh abuse, active smoker. Patient presented with 4 days of nausea and vomiting. Symptoms worsened and started to have hematemesis and blood stools and was associated with palpitations, chills, dizziness. Came to the ER once symptoms worsened and was noted to be hypotensive, Hb of 5. Patient was given PRBC infusions, seen by GI and currently being managed for GI bleed. Also noted to have leukocytosis and was started on Ceftriaxone.       Acute GI Bleed  Leukocytosis   Transaminitis       - Blood cultures pending  - Urine Cx pending  - CT A/P reporting Cirrhosis with findings of portal hypertension. Hepatosplenomegaly.  - UA 3/30 negative for UTI   - Procalcitonin level ordered   - D/C Ceftriaxone   - Follow up cultures  - Trend Fever  - Trend WBC      Thank you for allowing me to participate in the care of your patient.   Will Follow

## 2024-03-31 NOTE — PROGRESS NOTE ADULT - ASSESSMENT
anemia  cirrhosis  'gi bleed    plan  upper gastrointestinal endoscopy on Monday  can start clears, s tolerated   NPO at midnight for EGD   ppi drip  octreotide drip  gerd precautions  daily cbc   transfuse prn   consider hematology eval  check iron studies   will need clearance  FOBT- negative   further recommendations pending above    Advanced care planning was discussed with patient and family.  Advanced care planning forms were reviewed and discussed.  Risks, benefits and alternatives of gastroenterologic procedures were discussed in detail and all questions were answered.    30 minutes spent.

## 2024-03-31 NOTE — CONSULT NOTE ADULT - SUBJECTIVE AND OBJECTIVE BOX
[facility]  INFECTIOUS DISEASES   [address] [place] [address]  Tel: [phone]     Fax: [phone]  MD Rebecca Pearson MD Shah, Kaushal, MD Sunjit, Jaspal, MD  ========================================================  Weekend Coverage  =======================================================      [id number]  COOKIE ALDRICH    CC: Patient is a 53y old  Female who presents with a chief complaint of Anemia/gastroenteritis (30 Mar 2024 22:03)      53y  Female with h/o  Etoh abuse, active smoker. Patient presented with 4 days of nausea and vomiting. Symptoms worsened and started to have hematemesis and blood stools and was associated with palpitations, chills, dizziness. Came to the ER once symptoms worsened and was noted to be hypotensive, Hb of 5. Patient was given PRBC infusions, seen by GI and currently being managed for GI bleed. Also noted to have leukocytosis and was started on Ceftriaxone. ID input requested.       Past Medical & Surgical Hx:  No pertinent past medical history  No significant past surgical history      Social Hx:  Cigarette smoker   ETOH use      FAMILY HISTORY:  Reports no FH of medical problems of parents or siblings       Allergies  No Known Allergies      REVIEW OF SYSTEMS:  CONSTITUTIONAL:  No Fever or chills  HEENT:  No diplopia or blurred vision.  No earache, sore throat or runny nose.  CARDIOVASCULAR:  No chest pain  RESPIRATORY:  No cough, shortness of breath  GASTROINTESTINAL:  No nausea, vomiting or diarrhea.  GENITOURINARY:  No dysuria, frequency or urgency. No Blood in urine  MUSCULOSKELETAL:  no joint aches, no muscle pain  SKIN:  No change in skin, hair or nails.  NEUROLOGIC:  No Headaches      Physical Exam:  GEN: NAD  HEENT: normocephalic and atraumatic. EOMI. PERRL.    NECK: Supple.   LUNGS: CTA B/L.  HEART: RRR  ABDOMEN: Soft, NT, ND.  +BS.    : No CVA tenderness  EXTREMITIES: Without  edema.  MSK: No joint swelling  NEUROLOGIC: No Focal Deficits   SKIN: No rash      Vitals:  T(F): 98.3 (31 Mar 2024 05:17), Max: 99.1 (30 Mar 2024 22:36)  HR: 89 (31 Mar 2024 05:17)  BP: 90/53 (31 Mar 2024 05:17)  RR: 18 (31 Mar 2024 05:17)  SpO2: 93% (31 Mar 2024 05:17) (91% - 97%)  temp max in last 48H T(F): , Max: 99.1 (03-30-24 @ 22:36)    Current Antibiotics:  cefTRIAXone   IVPB      cefTRIAXone   IVPB 1000 milliGRAM(s) IV Intermittent every 24 hours    Other medications:  acetaminophen   IVPB .. 1000 milliGRAM(s) IV Intermittent once  nicotine -   7 mG/24Hr(s) Patch 1 Patch Transdermal daily  octreotide  Infusion 50 MICROgram(s)/Hr IV Continuous <Continuous>  pantoprazole Infusion 8 mG/Hr IV Continuous <Continuous>  sucralfate suspension 1 Gram(s) Oral every 6 hours                            7.1    14.56 )-----------( 107      ( 31 Mar 2024 06:25 )             20.5     03-30    134<L>  |  100  |  45<H>  ----------------------------<  117<H>  3.8   |  23  |  0.73    Ca    7.2<L>      30 Mar 2024 16:15  Phos  2.2     03-31  Mg     1.7     03-31    TPro  5.6<L>  /  Alb  2.6<L>  /  TBili  5.7<H>  /  DBili  x   /  AST  216<H>  /  ALT  67  /  AlkPhos  142<H>  03-30    RECENT CULTURES:      WBC Count: 14.56 K/uL (03-31-24 @ 06:25)  WBC Count: 15.53 K/uL (03-31-24 @ 01:16)  WBC Count: 16.13 K/uL (03-30-24 @ 16:15)  WBC Count: 27.13 K/uL (03-30-24 @ 04:59)    Creatinine: 0.73 mg/dL (03-30-24 @ 16:15)  Creatinine: 0.96 mg/dL (03-30-24 @ 04:59)    C-Reactive Protein, Serum: 54 mg/L (03-30-24 @ 16:15)    Sedimentation Rate, Erythrocyte: 42 mm/hr (03-30-24 @ 16:15)    Urinalysis (03.30.24 @ 09:52)    Glucose Qualitative, Urine: Negative mg/dL   Blood, Urine: Negative   pH Urine: 5.5   Color: Yellow   Urine Appearance: Clear   Bilirubin: Negative   Ketone - Urine: Negative mg/dL   Specific Gravity: 1.041   Protein, Urine: Negative mg/dL   Urobilinogen: 1.0 mg/dL   Nitrite: Negative   Leukocyte Esterase Concentration: Negative             < from: CT Abdomen and Pelvis w/ IV Cont (03.30.24 @ 06:11) >  ACC: [id number] EXAM:  CT ABDOMEN AND PELVIS IC   ORDERED BY: JOLIE BANEGAS     PROCEDURE DATE:  03/30/2024      INTERPRETATION:  CLINICAL INFORMATION: Abdominal pain.    COMPARISON: None.    CONTRAST/COMPLICATIONS:  IV Contrast: Omnipaque 350  90 cc administered   10 cc discarded  Oral Contrast: NONE  Complications: None reported at time of study completion    PROCEDURE:  CT of the Abdomen and Pelvis was performed.  Sagittal and coronal reformats were performed.    FINDINGS:  LOWER CHEST: No consolidation or pleural effusion. Partially imaged right   breast implant.    LIVER: Markedly enlarged measuring 27.5 cm in length. Cirrhotic   morphology. Heterogeneous containing multiple poorly defined   hypodensities. Consider nonemergent MR to exclude hepatocellular   malignancy or metastasis.  BILE DUCTS: Normal caliber.  GALLBLADDER: Within normal limits.  SPLEEN: Enlarged measuring 15.2 cm in length.  PANCREAS: Within normal limits.  ADRENALS: Within normal limits.  KIDNEYS/URETERS: Within normal limits.    BLADDER: Mild wall thickening, difficult to assess secondary to   inadequate distention.  REPRODUCTIVE ORGANS: Intrauterine device in situ. Suggestion of 1.4 cm   posterior uterine fibroid.    BOWEL: No bowel obstruction.Normal appendix. Diverticulosis coli.  PERITONEUM: Trace pelvic free fluid.  VESSELS: Atherosclerotic changes. Multiple collateral vessels in the   upper abdomen with recanalization of the umbilical vein.  RETROPERITONEUM/LYMPH NODES: No lymphadenopathy.  ABDOMINAL WALL: Cutaneous umbilical ring.  BONES: Mild spondylotic changes of the spine.    IMPRESSION:    Cirrhosis with findings of portal hypertension.    Heterogeneous liver containing multiple poorly defined hypodensities.   Consider nonemergent MR to exclude hepatocellular malignancy or   metastasis.    Hepatosplenomegaly.    Mild bladder wall thickening, difficult to assess secondary to inadequate   distention. Correlate with urinalysis and lab values to assess for   cystitis and/or ascending urinary tract infection.    --- End of Report ---    < end of copied text >

## 2024-03-31 NOTE — PROGRESS NOTE ADULT - PROBLEM SELECTOR PLAN 1
-multiple bouts of bloody emesis, vomiting clots of blood as per patient. Last emesis in AM.  -Ct scan illustrating cirrhosis + portal hypertension, significant alcohol consumption hx ,  bloody emesis likely 2/2 variceal bleeding  -hgh 5.0 on admission, maintain Hgb>7  -As per GI, will hold off fluids, will give blood product primarily  - at risk for decompensation, low BPs, hold off BBs  - f/u MRI abdomen w/wo IV contrast  - f/u Tumor markers (CEA, AFP, , CA-125)  - c/w octreotide  ggt  - c/w Pantoprazole ggt  - Zofran for nausea  - Clear liquid diet   -GI consulted -multiple bouts of bloody emesis, vomiting clots of blood as per patient. Last emesis in AM.  -Ct scan illustrating cirrhosis + portal hypertension, significant alcohol consumption hx ,  bloody emesis likely 2/2 variceal bleeding  -hgh 5.0 on admission, maintain Hgb>7  -As per GI, will hold off fluids, will give blood product primarily  - at risk for decompensation, low BPs, hold off BBs  - f/u MRI abdomen w/wo IV contrast  - f/u Tumor markers (CEA, AFP, , CA-125)  - c/w octreotide  ggt  - c/w Pantoprazole ggt  - Zofran for nausea  - Clear liquid diet   - GI consulted - plan for endoscopy in AM - no absolute medical contraindications to proposed procedure

## 2024-03-31 NOTE — PROGRESS NOTE ADULT - PROBLEM SELECTOR PLAN 3
increased wbc 27, multifactorral stress induced + infection  lactate 4.1 trend  f/u blood cx, urine cx  GI PCR  s/p 1.5L  - elevated CRP, ESR  - s/p ceftriaxone in ED, monitor off abx  - ID consulted Severe sepsis POA - now much improved  increased wbc 27, multifactorial stress induced + ?infection  lactate 4.1 trend  f/u blood cx, urine cx  GI PCR  s/p 1.5L  - elevated CRP, ESR  - s/p ceftriaxone in ED, monitor off abx per ID  - ID consulted

## 2024-03-31 NOTE — PROGRESS NOTE ADULT - ASSESSMENT
53y F, etoh abuse, per pt no other PMH, admitted for UGI bleed.  53y F with PMHx of EtOH use disorder admitted for UGI bleed and severe anemia.

## 2024-04-01 DIAGNOSIS — K74.60 UNSPECIFIED CIRRHOSIS OF LIVER: ICD-10-CM

## 2024-04-01 LAB
ALBUMIN SERPL ELPH-MCNC: 2.6 G/DL — LOW (ref 3.3–5)
ALP SERPL-CCNC: 126 U/L — HIGH (ref 40–120)
ALT FLD-CCNC: 91 U/L — HIGH (ref 12–78)
ANION GAP SERPL CALC-SCNC: 9 MMOL/L — SIGNIFICANT CHANGE UP (ref 5–17)
APTT BLD: 32.8 SEC — SIGNIFICANT CHANGE UP (ref 24.5–35.6)
AST SERPL-CCNC: 242 U/L — HIGH (ref 15–37)
BASOPHILS # BLD AUTO: 0.04 K/UL — SIGNIFICANT CHANGE UP (ref 0–0.2)
BASOPHILS NFR BLD AUTO: 0.3 % — SIGNIFICANT CHANGE UP (ref 0–2)
BILIRUB SERPL-MCNC: 7.6 MG/DL — HIGH (ref 0.2–1.2)
BUN SERPL-MCNC: 14 MG/DL — SIGNIFICANT CHANGE UP (ref 7–23)
CALCIUM SERPL-MCNC: 7.9 MG/DL — LOW (ref 8.5–10.1)
CHLORIDE SERPL-SCNC: 102 MMOL/L — SIGNIFICANT CHANGE UP (ref 96–108)
CK MB BLD-MCNC: <1.4 % — SIGNIFICANT CHANGE UP (ref 0–3.5)
CK MB CFR SERPL CALC: <1 NG/ML — SIGNIFICANT CHANGE UP (ref 0–3.6)
CK MB CFR SERPL CALC: <1 NG/ML — SIGNIFICANT CHANGE UP (ref 0–3.6)
CK SERPL-CCNC: 74 U/L — SIGNIFICANT CHANGE UP (ref 26–192)
CO2 SERPL-SCNC: 25 MMOL/L — SIGNIFICANT CHANGE UP (ref 22–31)
CREAT SERPL-MCNC: 0.67 MG/DL — SIGNIFICANT CHANGE UP (ref 0.5–1.3)
EGFR: 106 ML/MIN/1.73M2 — SIGNIFICANT CHANGE UP
EOSINOPHIL # BLD AUTO: 0.5 K/UL — SIGNIFICANT CHANGE UP (ref 0–0.5)
EOSINOPHIL NFR BLD AUTO: 3.8 % — SIGNIFICANT CHANGE UP (ref 0–6)
GLUCOSE SERPL-MCNC: 120 MG/DL — HIGH (ref 70–99)
HCT VFR BLD CALC: 24.7 % — LOW (ref 34.5–45)
HGB BLD-MCNC: 8.4 G/DL — LOW (ref 11.5–15.5)
IMM GRANULOCYTES NFR BLD AUTO: 1.2 % — HIGH (ref 0–0.9)
INR BLD: 1.37 RATIO — HIGH (ref 0.85–1.18)
LYMPHOCYTES # BLD AUTO: 0.81 K/UL — LOW (ref 1–3.3)
LYMPHOCYTES # BLD AUTO: 6.2 % — LOW (ref 13–44)
MAGNESIUM SERPL-MCNC: 2.3 MG/DL — SIGNIFICANT CHANGE UP (ref 1.6–2.6)
MCHC RBC-ENTMCNC: 30.7 PG — SIGNIFICANT CHANGE UP (ref 27–34)
MCHC RBC-ENTMCNC: 34 GM/DL — SIGNIFICANT CHANGE UP (ref 32–36)
MCV RBC AUTO: 90.1 FL — SIGNIFICANT CHANGE UP (ref 80–100)
MONOCYTES # BLD AUTO: 1.04 K/UL — HIGH (ref 0–0.9)
MONOCYTES NFR BLD AUTO: 7.9 % — SIGNIFICANT CHANGE UP (ref 2–14)
NEUTROPHILS # BLD AUTO: 10.59 K/UL — HIGH (ref 1.8–7.4)
NEUTROPHILS NFR BLD AUTO: 80.6 % — HIGH (ref 43–77)
NRBC # BLD: 0 /100 WBCS — SIGNIFICANT CHANGE UP (ref 0–0)
PHOSPHATE SERPL-MCNC: 1.2 MG/DL — LOW (ref 2.5–4.5)
PLATELET # BLD AUTO: 105 K/UL — LOW (ref 150–400)
POTASSIUM SERPL-MCNC: 3.3 MMOL/L — LOW (ref 3.5–5.3)
POTASSIUM SERPL-SCNC: 3.3 MMOL/L — LOW (ref 3.5–5.3)
PROT SERPL-MCNC: 5.8 G/DL — LOW (ref 6–8.3)
PROTHROM AB SERPL-ACNC: 15.9 SEC — HIGH (ref 9.5–13)
RBC # BLD: 2.74 M/UL — LOW (ref 3.8–5.2)
RBC # FLD: 21.2 % — HIGH (ref 10.3–14.5)
SODIUM SERPL-SCNC: 136 MMOL/L — SIGNIFICANT CHANGE UP (ref 135–145)
TROPONIN I, HIGH SENSITIVITY RESULT: 146 NG/L — HIGH
TROPONIN I, HIGH SENSITIVITY RESULT: 156.1 NG/L — HIGH
WBC # BLD: 13.14 K/UL — HIGH (ref 3.8–10.5)
WBC # FLD AUTO: 13.14 K/UL — HIGH (ref 3.8–10.5)

## 2024-04-01 PROCEDURE — 99233 SBSQ HOSP IP/OBS HIGH 50: CPT | Mod: GC

## 2024-04-01 PROCEDURE — 99232 SBSQ HOSP IP/OBS MODERATE 35: CPT

## 2024-04-01 PROCEDURE — G0452: CPT | Mod: 26

## 2024-04-01 PROCEDURE — 88305 TISSUE EXAM BY PATHOLOGIST: CPT | Mod: 26

## 2024-04-01 PROCEDURE — 93010 ELECTROCARDIOGRAM REPORT: CPT

## 2024-04-01 PROCEDURE — 88312 SPECIAL STAINS GROUP 1: CPT | Mod: 26

## 2024-04-01 DEVICE — ESOPHAGEAL BALLOON CATH CRE FIXED WIRE 10-11-12MM: Type: IMPLANTABLE DEVICE | Status: FUNCTIONAL

## 2024-04-01 DEVICE — HEMOSPRAY HEMOSTAT ENDO 7F: Type: IMPLANTABLE DEVICE | Status: FUNCTIONAL

## 2024-04-01 DEVICE — ESOPHAGEAL BALLOON CATH CRE FIXED WIRE 6-7-8MM: Type: IMPLANTABLE DEVICE | Status: FUNCTIONAL

## 2024-04-01 DEVICE — ESOPHAGEAL BALLOON CATH CRE FIXED WIRE 12-13.5-15MM: Type: IMPLANTABLE DEVICE | Status: FUNCTIONAL

## 2024-04-01 DEVICE — ESOPHAGEAL BALLOON CATH CRE FIXED WIRE 8-9-10MM: Type: IMPLANTABLE DEVICE | Status: FUNCTIONAL

## 2024-04-01 DEVICE — ESOPHAGEAL BALLOON CATH CRE FIXED WIRE 15-16.5-18MM: Type: IMPLANTABLE DEVICE | Status: FUNCTIONAL

## 2024-04-01 RX ORDER — SODIUM CHLORIDE 9 MG/ML
1000 INJECTION, SOLUTION INTRAVENOUS
Refills: 0 | Status: DISCONTINUED | OUTPATIENT
Start: 2024-04-01 | End: 2024-04-01

## 2024-04-01 RX ORDER — PREDNISOLONE 5 MG
40 TABLET ORAL EVERY 24 HOURS
Refills: 0 | Status: DISCONTINUED | OUTPATIENT
Start: 2024-04-01 | End: 2024-04-03

## 2024-04-01 RX ORDER — POTASSIUM PHOSPHATE, MONOBASIC POTASSIUM PHOSPHATE, DIBASIC 236; 224 MG/ML; MG/ML
30 INJECTION, SOLUTION INTRAVENOUS ONCE
Refills: 0 | Status: COMPLETED | OUTPATIENT
Start: 2024-04-01 | End: 2024-04-01

## 2024-04-01 RX ORDER — POTASSIUM CHLORIDE 20 MEQ
40 PACKET (EA) ORAL EVERY 4 HOURS
Refills: 0 | Status: DISCONTINUED | OUTPATIENT
Start: 2024-04-01 | End: 2024-04-01

## 2024-04-01 RX ORDER — PANTOPRAZOLE SODIUM 20 MG/1
40 TABLET, DELAYED RELEASE ORAL EVERY 12 HOURS
Refills: 0 | Status: DISCONTINUED | OUTPATIENT
Start: 2024-04-01 | End: 2024-04-03

## 2024-04-01 RX ADMIN — PANTOPRAZOLE SODIUM 10 MG/HR: 20 TABLET, DELAYED RELEASE ORAL at 03:36

## 2024-04-01 RX ADMIN — Medication 1 GRAM(S): at 11:56

## 2024-04-01 RX ADMIN — Medication 40 MILLIGRAM(S): at 18:57

## 2024-04-01 RX ADMIN — PANTOPRAZOLE SODIUM 10 MG/HR: 20 TABLET, DELAYED RELEASE ORAL at 12:57

## 2024-04-01 RX ADMIN — Medication 1 PATCH: at 08:31

## 2024-04-01 RX ADMIN — POTASSIUM PHOSPHATE, MONOBASIC POTASSIUM PHOSPHATE, DIBASIC 83.33 MILLIMOLE(S): 236; 224 INJECTION, SOLUTION INTRAVENOUS at 12:37

## 2024-04-01 RX ADMIN — Medication 1 MILLIGRAM(S): at 11:57

## 2024-04-01 RX ADMIN — Medication 1 PATCH: at 19:44

## 2024-04-01 RX ADMIN — Medication 1 GRAM(S): at 23:14

## 2024-04-01 RX ADMIN — Medication 1 PATCH: at 11:57

## 2024-04-01 RX ADMIN — Medication 1 GRAM(S): at 17:36

## 2024-04-01 RX ADMIN — Medication 1 GRAM(S): at 05:02

## 2024-04-01 RX ADMIN — Medication 100 MILLIGRAM(S): at 08:38

## 2024-04-01 RX ADMIN — Medication 1 SPRAY(S): at 05:03

## 2024-04-01 RX ADMIN — PANTOPRAZOLE SODIUM 40 MILLIGRAM(S): 20 TABLET, DELAYED RELEASE ORAL at 17:37

## 2024-04-01 NOTE — PROGRESS NOTE ADULT - PROBLEM SELECTOR PLAN 5
CT: Heterogeneous liver containing multiple poorly defined hypodensities.  F/u: alpha feto protein, Ca- 19  Hepatitis panel  f/u MRI  Heme/onc consulted CT: Heterogeneous liver containing multiple poorly defined hypodensities.  Tumor markers resulted  Hepatitis panel negative  f/u MRI  Heme/onc consulted CT: Heterogeneous liver containing multiple poorly defined hypodensities.  F/up Tumor markers -- heme/onc (Dr. Espinosa group) following, recs appre  Hepatitis panel negative  f/u MRI  Heme/onc consulted

## 2024-04-01 NOTE — PROGRESS NOTE ADULT - PROBLEM SELECTOR PLAN 2
- CT scan illustrating cirrhosis + portal hypertension  - likely secondary to alcohol use  - MELD score 19  - Maddrey's Discrimination Function Score 28.8  - monitor off prednisone  - GI following, recs appreciated - CT scan illustrating cirrhosis + portal hypertension  - likely alcoholic cirrhosis  - MELD score 19  - Maddrey's Discrimination Function Score 28.8, but rising bilirubin 4.3 ->5.7 -> 7.6  - GI (Dr. Beard), recs appreciated -- GI rec to start prednisolone tonight as pt admitted to drinking alcohol within past 1-2 weeks and GI suspects pt having acute alcoholic hepatitis  - f/u MRI abdomen w/wo IV contrast to better assess liver  - pt denies daily alcohol use and reports no withdrawal symptoms -- will monitor closely and if signs of withdrawal will initiate CIWA

## 2024-04-01 NOTE — CARE COORDINATION ASSESSMENT. - NSCAREPROVIDERS_GEN_ALL_CORE_FT
CARE PROVIDERS:  Accepting Physician: Mando Blake  Administration: Isiah Carnes  Administration: Ekta Olivares  Admitting: Mando Blake  Attending: eDondre Stafford  Case Management: Fermin Norton  Consultant: Yosi Beard  Consultant: Aaron Kebede  Consultant: Marisel Carrillo  Consultant: Elfego Silver  Consultant: Hon Jesús  Consultant: Keturah Prince  Consultant: Malu Guerra  Covering Team: Chito Leos  Covering Team: Roberto Aj  Covering Team: Jaspal Mccormick  ED Attending: Dago Cheng  ED Nurse: Cheikh Cruz  ED Nurse 2: Cheikh Cruz  HIM/Billing & Coding: Alisson Goss  Nurse: Yahaira Peters  Nurse: Karen Steiner  Nurse: Jaxon Beard  Ordered: Doctor, Unknown  Ordered: ADM, User  Override: Yahaira Peters  Override: Veronica Hinds  Primary Team: Sai Bautista  Primary Team: Nohemi Ceja  Primary Team: Galileo Diaz  Primary Team: Deondre Stafford  Primary Team: Emily Rebollar  Registered Dietitian: Samra Burris  Respiratory Therapy: Flakito Talamantes  : Wendy Carrasco  Team: [facility]  Hospitalists, Team

## 2024-04-01 NOTE — PROGRESS NOTE ADULT - PROBLEM SELECTOR PLAN 3
- Blood loss 2/2 suspected UGIB  - hgh 5.0 on admission  - s/p 1.5 L fluid  - s/p 3 Units in ED  - repeat Hgb shows 7.1  - 1 u pRBC + plasma ordered for 3/31, repeat H+H at 5PM  - maintain Hgb>7  - Maintain active type and screen  - f/u Hemochromatosis gene mutation lab, elevated ferritin  - Heme onc consulted - Blood loss 2/2 suspected UGIB  - hgh 5.0 on admission  - s/p 1.5 L fluid  - s/p 3 Units in ED  - AM Hgb of 8.4, trend  - maintain Hgb>7  - Maintain active type and screen  - f/u Hemochromatosis gene mutation lab, elevated ferritin  - Heme onc consulted - acute blood loss anemia 2/2 UGIB  - hgb 5.0 on admission  - s/p 1.5 L fluid  - s/p 3 Units PRBC in ED  - AM Hgb of 8.4, trend; appears to have stabilized   - maintain Hgb>7  - Maintain active type and screen  - f/u Hemochromatosis gene mutation lab, elevated ferritin  - Heme onc (Dr. Espinosa group) consulted, recs appreciated

## 2024-04-01 NOTE — PROGRESS NOTE ADULT - SUBJECTIVE AND OBJECTIVE BOX
Patient is a 53y old  Female who presents with a chief complaint of Anemia/gastroenteritis (01 Apr 2024 10:32)      INTERVAL HPI/OVERNIGHT EVENTS:  MAHENDRA  Patient sitting in bed, states her IV line has been bothering her  Patient reports she's been having chest heaviness since 8PM yesterday, does not radiate, constant  Patient states she has been having post nasal drip  All questiosn answered during visit      MEDICATIONS  (STANDING):  acetaminophen   IVPB .. 1000 milliGRAM(s) IV Intermittent once  fluticasone propionate 50 MICROgram(s)/spray Nasal Spray 1 Spray(s) Both Nostrils two times a day  folic acid 1 milliGRAM(s) Oral daily  lactated ringers 1000 milliLiter(s) (65 mL/Hr) IV Continuous <Continuous>  nicotine -   7 mG/24Hr(s) Patch 1 Patch Transdermal daily  octreotide  Infusion 50 MICROgram(s)/Hr (10 mL/Hr) IV Continuous <Continuous>  pantoprazole Infusion 8 mG/Hr (10 mL/Hr) IV Continuous <Continuous>  potassium phosphate IVPB 30 milliMole(s) IV Intermittent once  sucralfate suspension 1 Gram(s) Oral every 6 hours    MEDICATIONS  (PRN):  acetaminophen     Tablet .. 650 milliGRAM(s) Oral every 6 hours PRN Temp greater or equal to 38C (100.4F), Mild Pain (1 - 3)  artificial  tears Solution 1 Drop(s) Both EYES two times a day PRN Dry Eyes  guaiFENesin Oral Liquid (Sugar-Free) 100 milliGRAM(s) Oral every 6 hours PRN Cough  melatonin 3 milliGRAM(s) Oral at bedtime PRN Insomnia  traMADol 25 milliGRAM(s) Oral every 4 hours PRN Moderate Pain (4 - 6)      Allergies    No Known Allergies    Intolerances        REVIEW OF SYSTEMS:  CONSTITUTIONAL: No fever or chills  HEENT:  No headache, no sore throat  RESPIRATORY: endorses cough, no wheezing, or shortness of breath  CARDIOVASCULAR: No chest pain, palpitations, endorses heaviness  GASTROINTESTINAL: No abd pain, nausea, vomiting, or diarrhea  GENITOURINARY: No dysuria, frequency, or hematuria  NEUROLOGICAL: no focal weakness or dizziness  MUSCULOSKELETAL: no myalgias       Vital Signs Last 24 Hrs  T(C): 37.1 (01 Apr 2024 05:25), Max: 37.4 (01 Apr 2024 04:55)  T(F): 98.7 (01 Apr 2024 05:25), Max: 99.3 (01 Apr 2024 04:55)  HR: 79 (01 Apr 2024 05:25) (79 - 92)  BP: 92/51 (01 Apr 2024 05:25) (92/51 - 97/60)  BP(mean): --  RR: 16 (01 Apr 2024 05:25) (16 - 18)  SpO2: 92% (01 Apr 2024 05:25) (90% - 93%)    Parameters below as of 01 Apr 2024 05:25  Patient On (Oxygen Delivery Method): room air        PHYSICAL EXAM:  GENERAL: NAD  HEENT:  anicteric, moist mucous membranes  CHEST/LUNG:  CTA b/l, no rales, wheezes, or rhonchi  HEART:  RRR, S1, S2  ABDOMEN:  soft, nontender, nondistended  MUSCULOSKELETAL: no edema, cyanosis, or calf tenderness  NEUROLOGIC: answers questions and follows commands appropriately      LABS:                        8.4    13.14 )-----------( 105      ( 01 Apr 2024 07:00 )             24.7     CBC Full  -  ( 01 Apr 2024 07:00 )  WBC Count : 13.14 K/uL  Hemoglobin : 8.4 g/dL  Hematocrit : 24.7 %  Platelet Count - Automated : 105 K/uL  Mean Cell Volume : 90.1 fl  Mean Cell Hemoglobin : 30.7 pg  Mean Cell Hemoglobin Concentration : 34.0 gm/dL  Auto Neutrophil # : 10.59 K/uL  Auto Lymphocyte # : 0.81 K/uL  Auto Monocyte # : 1.04 K/uL  Auto Eosinophil # : 0.50 K/uL  Auto Basophil # : 0.04 K/uL  Auto Neutrophil % : 80.6 %  Auto Lymphocyte % : 6.2 %  Auto Monocyte % : 7.9 %  Auto Eosinophil % : 3.8 %  Auto Basophil % : 0.3 %    01 Apr 2024 07:00    136    |  102    |  14     ----------------------------<  120    3.3     |  25     |  0.67     Ca    7.9        01 Apr 2024 07:00  Phos  1.2       01 Apr 2024 07:00  Mg     2.3       01 Apr 2024 07:00    TPro  5.8    /  Alb  2.6    /  TBili  7.6    /  DBili  x      /  AST  242    /  ALT  91     /  AlkPhos  126    01 Apr 2024 07:00    PT/INR - ( 01 Apr 2024 07:00 )   PT: 15.9 sec;   INR: 1.37 ratio         PTT - ( 01 Apr 2024 07:00 )  PTT:32.8 sec  Urinalysis Basic - ( 01 Apr 2024 07:00 )    Color: x / Appearance: x / SG: x / pH: x  Gluc: 120 mg/dL / Ketone: x  / Bili: x / Urobili: x   Blood: x / Protein: x / Nitrite: x   Leuk Esterase: x / RBC: x / WBC x   Sq Epi: x / Non Sq Epi: x / Bacteria: x      CAPILLARY BLOOD GLUCOSE            Culture - Urine (collected 03-30-24 @ 09:52)  Source: Clean Catch Clean Catch (Midstream)  Final Report (03-31-24 @ 12:32):    No growth    Culture - Blood (collected 03-30-24 @ 06:10)  Source: .Blood Blood-Peripheral  Preliminary Report (03-31-24 @ 13:02):    No growth at 24 hours    Culture - Blood (collected 03-30-24 @ 06:05)  Source: .Blood Blood-Peripheral  Preliminary Report (03-31-24 @ 13:02):    No growth at 24 hours        RADIOLOGY & ADDITIONAL TESTS:    Personally reviewed.     Consultant(s) Notes Reviewed:  [x] YES  [ ] NO     Patient is a 53y old  Female who presents with a chief complaint of hematemesis, melena, generalized weakness.      INTERVAL HPI/OVERNIGHT EVENTS:  Patient sitting in bed, states her IV line has been bothering her  Patient reported chest heaviness since ~8PM yesterday, does not radiate, gradually improving overnight.   Denies fever, chills, SOB, abd pain, n/v. No BMs.   Patient states she has been having some post nasal drip.      MEDICATIONS  (STANDING):  acetaminophen   IVPB .. 1000 milliGRAM(s) IV Intermittent once  fluticasone propionate 50 MICROgram(s)/spray Nasal Spray 1 Spray(s) Both Nostrils two times a day  folic acid 1 milliGRAM(s) Oral daily  lactated ringers 1000 milliLiter(s) (65 mL/Hr) IV Continuous <Continuous>  nicotine -   7 mG/24Hr(s) Patch 1 Patch Transdermal daily  octreotide  Infusion 50 MICROgram(s)/Hr (10 mL/Hr) IV Continuous <Continuous>  pantoprazole Infusion 8 mG/Hr (10 mL/Hr) IV Continuous <Continuous>  potassium phosphate IVPB 30 milliMole(s) IV Intermittent once  sucralfate suspension 1 Gram(s) Oral every 6 hours    MEDICATIONS  (PRN):  acetaminophen     Tablet .. 650 milliGRAM(s) Oral every 6 hours PRN Temp greater or equal to 38C (100.4F), Mild Pain (1 - 3)  artificial  tears Solution 1 Drop(s) Both EYES two times a day PRN Dry Eyes  guaiFENesin Oral Liquid (Sugar-Free) 100 milliGRAM(s) Oral every 6 hours PRN Cough  melatonin 3 milliGRAM(s) Oral at bedtime PRN Insomnia  traMADol 25 milliGRAM(s) Oral every 4 hours PRN Moderate Pain (4 - 6)      Allergies    No Known Allergies    Intolerances        REVIEW OF SYSTEMS:  CONSTITUTIONAL: No fever or chills  HEENT:  No headache, no sore throat  RESPIRATORY: endorses occasional cough, no wheezing, or shortness of breath  CARDIOVASCULAR: chest heaviness (improving), denies palpitations  GASTROINTESTINAL: No abd pain, nausea, vomiting, or diarrhea  GENITOURINARY: No dysuria, frequency, or hematuria  NEUROLOGICAL: no focal weakness or dizziness  MUSCULOSKELETAL: no myalgias       Vital Signs Last 24 Hrs  T(C): 37.1 (01 Apr 2024 05:25), Max: 37.4 (01 Apr 2024 04:55)  T(F): 98.7 (01 Apr 2024 05:25), Max: 99.3 (01 Apr 2024 04:55)  HR: 79 (01 Apr 2024 05:25) (79 - 92)  BP: 92/51 (01 Apr 2024 05:25) (92/51 - 97/60)  BP(mean): --  RR: 16 (01 Apr 2024 05:25) (16 - 18)  SpO2: 92% (01 Apr 2024 05:25) (90% - 93%)    Parameters below as of 01 Apr 2024 05:25  Patient On (Oxygen Delivery Method): room air        PHYSICAL EXAM:  GENERAL: NAD  HEENT:  anicteric, moist mucous membranes  CHEST/LUNG:  CTA b/l, no rales, wheezes, or rhonchi  HEART:  RRR, S1, S2  ABDOMEN: BS+, soft, nontender, nondistended  MUSCULOSKELETAL: no edema, cyanosis, or calf tenderness  NEUROLOGIC: answers questions and follows commands appropriately      LABS:                        8.4    13.14 )-----------( 105      ( 01 Apr 2024 07:00 )             24.7     CBC Full  -  ( 01 Apr 2024 07:00 )  WBC Count : 13.14 K/uL  Hemoglobin : 8.4 g/dL  Hematocrit : 24.7 %  Platelet Count - Automated : 105 K/uL  Mean Cell Volume : 90.1 fl  Mean Cell Hemoglobin : 30.7 pg  Mean Cell Hemoglobin Concentration : 34.0 gm/dL  Auto Neutrophil # : 10.59 K/uL  Auto Lymphocyte # : 0.81 K/uL  Auto Monocyte # : 1.04 K/uL  Auto Eosinophil # : 0.50 K/uL  Auto Basophil # : 0.04 K/uL  Auto Neutrophil % : 80.6 %  Auto Lymphocyte % : 6.2 %  Auto Monocyte % : 7.9 %  Auto Eosinophil % : 3.8 %  Auto Basophil % : 0.3 %    01 Apr 2024 07:00    136    |  102    |  14     ----------------------------<  120    3.3     |  25     |  0.67     Ca    7.9        01 Apr 2024 07:00  Phos  1.2       01 Apr 2024 07:00  Mg     2.3       01 Apr 2024 07:00    TPro  5.8    /  Alb  2.6    /  TBili  7.6    /  DBili  x      /  AST  242    /  ALT  91     /  AlkPhos  126    01 Apr 2024 07:00    PT/INR - ( 01 Apr 2024 07:00 )   PT: 15.9 sec;   INR: 1.37 ratio         PTT - ( 01 Apr 2024 07:00 )  PTT:32.8 sec  Urinalysis Basic - ( 01 Apr 2024 07:00 )    Color: x / Appearance: x / SG: x / pH: x  Gluc: 120 mg/dL / Ketone: x  / Bili: x / Urobili: x   Blood: x / Protein: x / Nitrite: x   Leuk Esterase: x / RBC: x / WBC x   Sq Epi: x / Non Sq Epi: x / Bacteria: x      CAPILLARY BLOOD GLUCOSE            Culture - Urine (collected 03-30-24 @ 09:52)  Source: Clean Catch Clean Catch (Midstream)  Final Report (03-31-24 @ 12:32):    No growth    Culture - Blood (collected 03-30-24 @ 06:10)  Source: .Blood Blood-Peripheral  Preliminary Report (03-31-24 @ 13:02):    No growth at 24 hours    Culture - Blood (collected 03-30-24 @ 06:05)  Source: .Blood Blood-Peripheral  Preliminary Report (03-31-24 @ 13:02):    No growth at 24 hours        RADIOLOGY & ADDITIONAL TESTS:    Personally reviewed.     Consultant(s) Notes Reviewed:  [x] YES  [ ] NO

## 2024-04-01 NOTE — PROGRESS NOTE ADULT - SUBJECTIVE AND OBJECTIVE BOX
[facility]   INFECTIOUS DISEASES   [address] [place] [address]  Tel: [phone]     Fax: [phone]  =========================================================  MD Harpreet Pearson Kaushal, MD Cho, Michelle, MD Sunjit, Jaspal, MD  =========================================================    [employer]DR[employer] [id number]    Follow up: GI bleed    Lying in bed, no complaint, no fever.   Was getting ECG due to a bump in tropnin     Allergies:  No Known Allergies    acetaminophen     Tablet .. 650 milliGRAM(s) Oral every 6 hours PRN  acetaminophen   IVPB .. 1000 milliGRAM(s) IV Intermittent once  artificial  tears Solution 1 Drop(s) Both EYES two times a day PRN  fluticasone propionate 50 MICROgram(s)/spray Nasal Spray 1 Spray(s) Both Nostrils two times a day  folic acid 1 milliGRAM(s) Oral daily  guaiFENesin Oral Liquid (Sugar-Free) 100 milliGRAM(s) Oral every 6 hours PRN  lactated ringers 1000 milliLiter(s) IV Continuous <Continuous>  melatonin 3 milliGRAM(s) Oral at bedtime PRN  nicotine -   7 mG/24Hr(s) Patch 1 Patch Transdermal daily  octreotide  Infusion 50 MICROgram(s)/Hr IV Continuous <Continuous>  pantoprazole Infusion 8 mG/Hr IV Continuous <Continuous>  sucralfate suspension 1 Gram(s) Oral every 6 hours  traMADol 25 milliGRAM(s) Oral every 4 hours PRN     REVIEW OF SYSTEMS:  CONSTITUTIONAL:  No Fever or chills  HEENT:   No diplopia or blurred vision.  No earache, sore throat or runny nose.  CARDIOVASCULAR:  No chest pain  RESPIRATORY:  No cough, shortness of breath, PND or orthopnea.  GASTROINTESTINAL:  No nausea, vomiting or diarrhea.  GENITOURINARY:  No dysuria, frequency or urgency. No Blood in urine  MUSCULOSKELETAL:  no joint aches, no muscle pain  SKIN:  No change in skin, hair or nails.     Physical Exam:  ICU Vital Signs Last 24 Hrs  T(C): 37.1 (01 Apr 2024 05:25), Max: 37.4 (01 Apr 2024 04:55)  T(F): 98.7 (01 Apr 2024 05:25), Max: 99.3 (01 Apr 2024 04:55)  HR: 79 (01 Apr 2024 05:25) (79 - 92)  BP: 92/51 (01 Apr 2024 05:25) (92/51 - 97/60)  RR: 16 (01 Apr 2024 05:25) (16 - 18)  SpO2: 92% (01 Apr 2024 05:25) (90% - 93%)  O2 Parameters below as of 01 Apr 2024 05:25  Patient On (Oxygen Delivery Method): room air  GEN: NAD  HEENT: normocephalic and atraumatic. EOMI. RUI.    NECK: Supple.  No lymphadenopathy   LUNGS: Clear to auscultation.  HEART: Regular rate and rhythm   ABDOMEN: Soft, nontender, and nondistended.    : No CVA tenderness  EXTREMITIES: Without edema.  MSK: no joint swelling  NEUROLOGIC: grossly intact.  PSYCHIATRIC: Appropriate affect .  SKIN: No rash     Labs:  04-01    136  |  102  |  14  ----------------------------<  120<H>  3.3<L>   |  25  |  0.67    Ca    7.9<L>      01 Apr 2024 07:00  Phos  1.2     04-01  Mg     2.3     04-01    TPro  5.8<L>  /  Alb  2.6<L>  /  TBili  7.6<H>  /  DBili  x   /  AST  242<H>  /  ALT  91<H>  /  AlkPhos  126<H>  04-01                        8.4    13.14 )-----------( 105      ( 01 Apr 2024 07:00 )             24.7     PT/INR - ( 01 Apr 2024 07:00 )   PT: 15.9 sec;   INR: 1.37 ratio    PTT - ( 01 Apr 2024 07:00 )  PTT:32.8 sec  Urinalysis Basic - ( 01 Apr 2024 07:00 )    Color: x / Appearance: x / SG: x / pH: x  Gluc: 120 mg/dL / Ketone: x  / Bili: x / Urobili: x   Blood: x / Protein: x / Nitrite: x   Leuk Esterase: x / RBC: x / WBC x   Sq Epi: x / Non Sq Epi: x / Bacteria: x    LIVER FUNCTIONS - ( 01 Apr 2024 07:00 )  Alb: 2.6 g/dL / Pro: 5.8 g/dL / ALK PHOS: 126 U/L / ALT: 91 U/L / AST: 242 U/L / GGT: x           RECENT CULTURES:  03-30 @ 09:52 Clean Catch Clean Catch (Midstream)     No growth    03-30 @ 06:10 .Blood Blood-Peripheral     No growth at 24 hours    03-30 @ 06:05 .Blood Blood-Peripheral     No growth at 24 hours    All imaging and data are reviewed.   < from: CT Abdomen and Pelvis w/ IV Cont (03.30.24 @ 06:11) >  IMPRESSION:  Cirrhosis with findings of portal hypertension.  Heterogeneous liver containing multiple poorly defined hypodensities.   Consider nonemergent MR to exclude hepatocellular malignancy or   metastasis.  Hepatosplenomegaly.  Mild bladder wall thickening, difficult to assess secondary to inadequate   distention. Correlate with urinalysis and lab values to assess for   cystitis and/or ascending urinary tract infection.    Assessment and Plan:   53y  Female with h/o  Etoh abuse, active smoker. Patient presented with 4 days of nausea and vomiting. Symptoms worsened and started to have hematemesis and blood stools and was associated with palpitations, chills, dizziness. Came to the ER once symptoms worsened and was noted to be hypotensive, Hb of 5. Patient was given PRBC infusions, seen by GI and currently being managed for GI bleed. Also noted to have leukocytosis that most likely is reactive to bleeding.   Now with small increase in Troponin.     # Acute GI Bleed  # Leukocytosis   # Transaminitis     - Blood cultures NGTD  - UA and urine Cx negative   - CT A/P reporting Cirrhosis with findings of portal hypertension. Hepatosplenomegaly.  - Monitor WBC coming down 27-->13k  - Follow Tmax, LFTs and cultures   - Watch off antibiotics     Will follow PRN.     Keturah Prince MD  Division of Infectious Diseases   Please call ID service at [phone] with any question.      50 minutes spent on total encounter assessing patient, examination, chart review, counseling and coordinating care by the attending physician/nurse/care manager.

## 2024-04-01 NOTE — DISCHARGE NOTE PROVIDER - NSDCMRMEDTOKEN_GEN_ALL_CORE_FT
folic acid 1 mg oral tablet: 1 tab(s) orally once a day  nadolol 20 mg oral tablet: 1 tab(s) orally once a day  nicotine 7 mg/24 hr transdermal film, extended release: 1 patch transdermal once a day  pantoprazole 40 mg oral delayed release tablet: 1 tab(s) orally once a day (before a meal)  prednisoLONE (as sodium phosphate) 15 mg/5 mL oral liquid: 13.33 milliliter(s) orally every 24 hours

## 2024-04-01 NOTE — PATIENT CHOICE NOTE. - NSPTCHOICESTATE_GEN_ALL_CORE

## 2024-04-01 NOTE — PROGRESS NOTE ADULT - PROBLEM SELECTOR PLAN 4
Severe sepsis POA - now much improved  increased wbc 27, multifactorial stress induced + ?infection  lactate 4.1 trend  f/u blood cx, urine cx  GI PCR  s/p 1.5L  - elevated CRP, ESR  - s/p ceftriaxone in ED, monitor off abx per ID  - ID consulted Severe sepsis POA - now much improved  increased wbc 27, multifactorial stress induced + ?infection  lactate 4.1on admission, repeats normalized  - blood cx, urine cx NGTD  GI PCR  s/p 1.5L  - elevated CRP, ESR  - s/p ceftriaxone in ED, monitor off abx per ID  - ID consulted - doubt pt was septic or had gastroenteritis -- likely symptoms due to UGIB  - lactic acidosis and leukocytosis likely related to acute UGIB w/ severe acute blood loss anemia   - lactate normalized with PRBC / fluid resuscitation   - blood cx, urine cx NGTD  - GI PCR pending  - leukocytosis rapidly improving   - elevated CRP, ESR  - monitor off abx per ID  - ID (Dr. Prince), recs appreciated

## 2024-04-01 NOTE — DISCHARGE NOTE PROVIDER - NSDCHC_MEDRECSTATUS_GEN_ALL_CORE
I left a message requesting a call back stating our next avab is 11/04 -    Admission Reconciliation is Not Complete  Discharge Reconciliation is Not Complete Admission Reconciliation is Completed  Discharge Reconciliation is Not Complete Admission Reconciliation is Completed  Discharge Reconciliation is Completed

## 2024-04-01 NOTE — PROGRESS NOTE ADULT - SUBJECTIVE AND OBJECTIVE BOX
[INTERVAL HX: ]  Patient seen and examined;  Chart reviewed and events noted;     [MEDICATIONS]  MEDICATIONS  (STANDING):  acetaminophen   IVPB .. 1000 milliGRAM(s) IV Intermittent once  fluticasone propionate 50 MICROgram(s)/spray Nasal Spray 1 Spray(s) Both Nostrils two times a day  folic acid 1 milliGRAM(s) Oral daily  lactated ringers 1000 milliLiter(s) (65 mL/Hr) IV Continuous <Continuous>  nicotine -   7 mG/24Hr(s) Patch 1 Patch Transdermal daily  octreotide  Infusion 50 MICROgram(s)/Hr (10 mL/Hr) IV Continuous <Continuous>  pantoprazole Infusion 8 mG/Hr (10 mL/Hr) IV Continuous <Continuous>  sucralfate suspension 1 Gram(s) Oral every 6 hours    MEDICATIONS  (PRN):  acetaminophen     Tablet .. 650 milliGRAM(s) Oral every 6 hours PRN Temp greater or equal to 38C (100.4F), Mild Pain (1 - 3)  artificial  tears Solution 1 Drop(s) Both EYES two times a day PRN Dry Eyes  guaiFENesin Oral Liquid (Sugar-Free) 100 milliGRAM(s) Oral every 6 hours PRN Cough  melatonin 3 milliGRAM(s) Oral at bedtime PRN Insomnia  traMADol 25 milliGRAM(s) Oral every 4 hours PRN Moderate Pain (4 - 6)      [VITALS]  Vital Signs Last 24 Hrs  T(C): 36.4 (01 Apr 2024 14:49), Max: 37.4 (01 Apr 2024 04:55)  T(F): 97.5 (01 Apr 2024 14:49), Max: 99.3 (01 Apr 2024 04:55)  HR: 77 (01 Apr 2024 14:49) (76 - 92)  BP: 83/63 (01 Apr 2024 14:49) (83/63 - 100/63)  BP(mean): --  RR: 16 (01 Apr 2024 14:49) (16 - 18)  SpO2: 93% (01 Apr 2024 14:49) (90% - 93%)    Parameters below as of 01 Apr 2024 14:49  Patient On (Oxygen Delivery Method): room air      [WT/HT]  Daily     Daily   [VENT]      [PHYSICAL EXAM]  GEN: NAD  HEENT: normocephalic and atraumatic. EOMI. PERRL.    NECK: Supple.  No lymphadenopathy   LUNGS: Clear to auscultation.  HEART: Regular rate and rhythm,  no MRG  ABDOMEN: Soft, nontender, and nondistended.  Positive bowel sounds.    : No CVA tenderness  EXTREMITIES: Without edema.  NEUROLOGIC: grossly intact.  PSYCHIATRIC: Appropriate affect .  SKIN: No rash     [LABS:]                        8.4    13.14 )-----------( 105      ( 01 Apr 2024 07:00 )             24.7     04-01    136  |  102  |  14  ----------------------------<  120<H>  3.3<L>   |  25  |  0.67    Ca    7.9<L>      01 Apr 2024 07:00  Phos  1.2     04-01  Mg     2.3     04-01    TPro  5.8<L>  /  Alb  2.6<L>  /  TBili  7.6<H>  /  DBili  x   /  AST  242<H>  /  ALT  91<H>  /  AlkPhos  126<H>  04-01    PT/INR - ( 01 Apr 2024 07:00 )   PT: 15.9 sec;   INR: 1.37 ratio         PTT - ( 01 Apr 2024 07:00 )  PTT:32.8 sec      Iron - Total Binding Capacity.: 186 ug/dL *L* [220 - 430] (03-31-24 @ 12:28)    Vitamin B12, Serum: 1121 pg/mL [232 - 1245] (03-31-24 @ 06:25)    Folate, Serum: 3.1 ng/mL *L* (03-31-24 @ 06:25)    Reticulocyte Count (03-30-24 @ 16:15)  Reticulocyte Percent: 3.9 % *H* [0.5 - 2.5]  Absolute Reticulocytes: 80.7 K/uL [25.0 - 125.0]    Sedimentation Rate, Erythrocyte: 42 mm/hr *H* [0 - 20] (03-30-24 @ 16:15)    Folate, RBC: 1416 ng/mL [499 - 1504] (03-30-24 @ 16:15)    Ferritin: 1237 ng/mL *H* [13 - 330] (03-30-24 @ 16:15)      Urinalysis Basic - ( 01 Apr 2024 07:00 )    Color: x / Appearance: x / SG: x / pH: x  Gluc: 120 mg/dL / Ketone: x  / Bili: x / Urobili: x   Blood: x / Protein: x / Nitrite: x   Leuk Esterase: x / RBC: x / WBC x   Sq Epi: x / Non Sq Epi: x / Bacteria: x    Carcinoembryonic Antigen: 4.6 ng/mL (03.31.24 @ 12:28)   Cancer Antigen, GI Ca 19-9: 84 U/mL (03.31.24 @ 06:25)   Cancer Antigen, 125: 19 U/mL (03.31.24 @ 12:28)     Culture - Urine (collected 30 Mar 2024 09:52)  Source: Clean Catch Clean Catch (Midstream)  Final Report (31 Mar 2024 12:32):    No growth    Culture - Blood (collected 30 Mar 2024 06:10)  Source: .Blood Blood-Peripheral  Preliminary Report (01 Apr 2024 13:02):    No growth at 48 Hours    Culture - Blood (collected 30 Mar 2024 06:05)  Source: .Blood Blood-Peripheral  Preliminary Report (01 Apr 2024 13:02):    No growth at 48 Hours    Culture - Urine (collected 30 Mar 2024 09:52)  Source: Clean Catch Clean Catch (Midstream)  Final Report (31 Mar 2024 12:32):    No growth    Culture - Blood (collected 30 Mar 2024 06:10)  Source: .Blood Blood-Peripheral  Preliminary Report (01 Apr 2024 13:02):    No growth at 48 Hours    Culture - Blood (collected 30 Mar 2024 06:05)  Source: .Blood Blood-Peripheral  Preliminary Report (01 Apr 2024 13:02):    No growth at 48 Hours        [RADIOLOGY STUDIES:]

## 2024-04-01 NOTE — PROGRESS NOTE ADULT - ASSESSMENT
[ASSESSMENT and  PLAN]  K70. 30   EtOH Cirrhosis  K92.2      GIB  D62         Aneia hemorrhage  D50.0      Fe def anemia   D69.6      Thrombocytopenia due to cirrhosis  K92.0      Hematemesis.  I85. 01     Esophageal varices with bleeding  K76.6      Portal hypertension.  K92.1      Melena  R16.0      Liver masses    54yo F with hx of EtOH abuse for 5-7yrs, presenting with GIB sx and symptomatic anemia  Hematemesis, suspected variceal bleeding.  Melena due to UGIB  Symptomatic anemia, requiring PRBC txfusion.   Hx Fe def anemia    Pt of [place], Mong[place]ian and English ancestry.   Denies history of hemochromatosis  Denies hx of hepatitis    Anemia due to hemorrhage  Anemia due to chronic disease  possible anemia due to Fe def, hx of per pt  Thrombocytopenia due to cirrhosis  Some concern for hemochromatosis due to ancestry and cirrhosis, age.     Liver with abn findings  ?liver masses  Cirrhosis with findings of portal hypertension.  Heterogeneous liver containing multiple poorly defined hypodensities.   AFP unimpressive  CEA   Consider nonemergent MR w and wo contrast to eval for hepatocellular malignancy or metastasis and iron overload.     Elevated ESR and CRP  cannot determine if elevated ferritin baseline or due to inflammation.    Ferritin 1237, Iron studies with saturation 67%     B12   1121,  Folate 3.1     ESR   42, CRP 54  Negative hepatitis serologies    RECOMMENDATIONS    GIB / Anemia  Transfuse PRBC as clinically indicated.   Transfuse PRBC if Hgb <7.0 or if symptomatic.   Follow CBC    GI eval   tentative EGD on Mon  d/w Dr. Beard    Await HFE gene mutation; ordered for tomorrow. D/w Pt. Consent signed; in physical chart    Start Folic acid 1mg daily.     Liver Mass?  recommend MRI abd when stable.  AFP 4.4 impressive  CEA 4.6 unimpressive    DVT Prophylaxis  SQ LMWH or Heparin Contraindicated  OOB as samia  SCD if warranted    Discussed plan of care with patient in detail.   Pt expressed understanding of the treatment plan.   Risks, benefits and alternatives discussed in detail.   Opportunity given for questions and discussion.   Questions or concerns all addressed and answered to their satisfaction, and in lay terms.     Thank you for consulting us.    [ASSESSMENT and  PLAN]  K70. 30   EtOH Cirrhosis  K92.2      GIB  D62         Anemia hemorrhage  D50.0      Fe def anemia   D69.6      Thrombocytopenia due to cirrhosis  K92.0      Hematemesis.  I85. 01     Esophageal varices with bleeding  K76.6      Portal hypertension.  K92.1      Melena  R16.0      Liver masses    52yo F with hx of EtOH abuse for 5-7yrs, presenting with GIB sx and symptomatic anemia  Hematemesis, suspected variceal bleeding.  Melena due to UGIB  Symptomatic anemia, requiring PRBC txfusion.   Hx Fe def anemia    Pt of [place], [place] and English ancestry.   Denies history of hemochromatosis  Denies hx of hepatitis    Anemia due to hemorrhage  Anemia due to chronic disease  possible anemia due to Fe def, hx of per pt  Thrombocytopenia due to cirrhosis  Some concern for hemochromatosis due to ancestry and cirrhosis, age.     Liver with abn findings  ?liver masses  Cirrhosis with findings of portal hypertension.  Heterogeneous liver containing multiple poorly defined hypodensities.   T bilirubin rising  AFP 4.4 unimpressive  CEA 4.6 unimpressive  Consider nonemergent MR w and wo contrast to eval for hepatocellular malignancy or metastasis and iron overload.     Elevated ESR and CRP  cannot determine if elevated ferritin baseline or due to inflammation.    Ferritin 1237, Iron studies with saturation 67%     B12   1121,  Folate 3.1     ESR   42, CRP 54  Negative hepatitis serologies    RECOMMENDATIONS    GIB / Anemia  Transfuse PRBC as clinically indicated.   Transfuse PRBC if Hgb <7.0 or if symptomatic.   Follow CBC    GI eval   tentative EGD on Mon  d/w Dr. Beard    Await HFE gene mutation; ordered for tomorrow. D/w Pt. Consent signed; in physical chart    Start Folic acid 1mg daily.     Liver Mass?  s/p MRI ab, await results  AFP 4.4 unimpressive  CEA 4.6 unimpressive    DVT Prophylaxis  SQ LMWH or Heparin Contraindicated  OOB as samia  SCD if warranted    Discussed plan of care with patient in detail.   Pt expressed understanding of the treatment plan.   Risks, benefits and alternatives discussed in detail.   Opportunity given for questions and discussion.   Questions or concerns all addressed and answered to their satisfaction, and in lay terms.     Thank you for consulting us.

## 2024-04-01 NOTE — DISCHARGE NOTE PROVIDER - CARE PROVIDER_API CALL
Yosi Beard  Gastroenterology  [address] [place] [id number]  Phone: [phone]  Fax: [phone]  Follow Up Time: 1 week    Hepatology Clinic, [place] [id number]  Phone: (   )    -  Fax: (   )    -  Follow Up Time: 1 week

## 2024-04-01 NOTE — DISCHARGE NOTE PROVIDER - NSFOLLOWUPCLINICS_GEN_ALL_ED_FT
Gastroenterology at [facility]  Gastroenterology  [address] [place], [place] [address]  Phone: [phone]  Fax:   Follow Up Time: 1 week

## 2024-04-01 NOTE — CASE MANAGEMENT PROGRESS NOTE - NSCMPROGRESSNOTE_GEN_ALL_CORE
Discussed pt on rounds, pt remains acute,  pt s/p 4 units PRBcS, 1 unit plasma, current hgb 8.4, NPO for endoscopy today, on IV protonix, on IV acreotide, troponin 156.1. CM will continue to collaborate with interdisciplinary team and remain available to assist.

## 2024-04-01 NOTE — CARE COORDINATION ASSESSMENT. - PRO ARRIVE FROM
CM met w pt. Per pt  lives with 15 yo daughter and was independent w ADL, ambulation, driving and med management prior to admission. Pt drives to appointments, has 7 stairs to enter home, 7 steps inside. denies community services, denies DME or need for usage. Pt has not identified a PCP in the community. PCP list has been provided and CM discussed importance of establishing care with PCP. Pt verbalized understanding of importance of following up with a PCP. CM to remain available throughout hospitalization. Pharmacy: [facility]. : stated no. Pt stated her mother Shira will  when medically safe for discharge./home

## 2024-04-01 NOTE — DISCHARGE NOTE PROVIDER - NSDCCPCAREPLAN_GEN_ALL_CORE_FT
PRINCIPAL DISCHARGE DIAGNOSIS  Diagnosis: GIB (gastrointestinal bleeding)  Assessment and Plan of Treatment: You were admitted to the hospital because your blood count was low. A gastroentestinal bleed was found. The bleed has since resolved. Follow up with gastroenterology and your primary care doctor within 1 week of discharge.        SECONDARY DISCHARGE DIAGNOSES  Diagnosis: Anemia due to acute blood loss  Assessment and Plan of Treatment:     Diagnosis: GIB (gastrointestinal bleeding)  Assessment and Plan of Treatment:      PRINCIPAL DISCHARGE DIAGNOSIS  Diagnosis: Anemia due to acute blood loss  Assessment and Plan of Treatment: You were admitted to the hospital because your blood count was low. A gastroentestinal bleed was found. The bleed has since resolved. Follow up with gastroenterology and your primary care doctor within 1 week of discharge.      SECONDARY DISCHARGE DIAGNOSES  Diagnosis: Cirrhosis  Assessment and Plan of Treatment: You were found to have liver disease due to your previous alcohol use. You had an endoscopy done which showed varices in your esophagus that were not bleeding, some inflammation in your duodenum, and some resolved bleeding in your stomach.  You were started on oral protonix once a day, nadolol 20mg daily and oral steroids to help treat your condition.  Please continue to take these medications and follow up in the [facility] hepatology clinic and with GI within 1-2 weeks of discharge.

## 2024-04-01 NOTE — PROVIDER CONTACT NOTE (CRITICAL VALUE NOTIFICATION) - BACKGROUND
admit diagnosis abdominal pain
patient has been free from any vomitus or loose stools, currently remains asymptomatic

## 2024-04-01 NOTE — PROGRESS NOTE ADULT - PROBLEM SELECTOR PLAN 1
-multiple bouts of bloody emesis, vomiting clots of blood as per patient. Last emesis in AM.  -Ct scan illustrating cirrhosis + portal hypertension, significant alcohol consumption hx ,  bloody emesis likely 2/2 variceal bleeding  -hgh 5.0 on admission, maintain Hgb>7  - at risk for decompensation, low BPs, hold off BBs  - f/u MRI abdomen w/wo IV contrast  - f/u Tumor markers (CEA, AFP, , CA-125)  - c/w octreotide  ggt  - c/w Pantoprazole ggt  - Clear liquid diet   - GI consulted - plan for endoscopy in AM - no absolute medical contraindications to proposed procedure -multiple bouts of bloody emesis, vomiting clots of blood as per patient. Last emesis in AM.  -Ct scan illustrating cirrhosis + portal hypertension, significant alcohol consumption hx ,  bloody emesis likely 2/2 variceal bleeding  -hgh 5.0 on admission, maintain Hgb>7  - leukocytosis, downtrending, likely reactive  - at risk for decompensation, low BPs, hold off BBs  - f/u MRI abdomen w/wo IV contrast  -  Tumor markers (CEA, AFP, , CA-125) resulted  - c/w octreotide  ggt  - c/w Pantoprazole ggt  - NPO for possible endoscopy  - 146/156 tropinin, EKG shows no ST elevations, likely demand ischemia, monitor  - GI consulted - plan for endoscopy possibly today - no absolute medical contraindications to proposed procedure - acute UGIB; multiple bouts of bloody emesis, vomiting clots of blood as per patient as outpt. Last emesis prior to admission. Also reported melena prior to admission.   - CT illustrating cirrhosis + portal hypertension; -- pt with significant alcohol consumption hx, there was concern bloody emesis may be 2/2 variceal bleeding  - Hgb 5.0 on admission, maintain Hgb>7  - leukocytosis, downtrending, likely reactive  - at risk for decompensation, low BPs, hold off BBs  - on octreotide ggt   - on Pantoprazole ggt  - NPO for EGD today  - troponin downtrending from 156 - 146; EKG shows no ST elevations, likely demand ischemia from acute blood loss anemia, monitor  - GI consulted - plan for endoscopy possibly today - no absolute medical contraindications to proposed procedure  - no need for rocephin for SBP ppx per ID given pt has no significant ascites -- ID (Dr. Prince), recs appreciated   - leukocytosis rapidly improving

## 2024-04-01 NOTE — PROGRESS NOTE ADULT - ASSESSMENT
53y F with PMHx of EtOH use disorder admitted for UGI bleed and severe anemia. 52yo F with PMHx of EtOH use disorder (reportedly drinking rarely now), alcoholic cirrhosis, p/w hematemesis, melena, generalized weakness admitted with acute UGIB and severe acute blood loss anemia.

## 2024-04-01 NOTE — PROVIDER CONTACT NOTE (CRITICAL VALUE NOTIFICATION) - ACTION/TREATMENT ORDERED:
pending orders
10 am troponin
Dr. Blake made aware of critical value, will carry out any additional orders

## 2024-04-01 NOTE — DISCHARGE NOTE PROVIDER - PROVIDER TOKENS
PROVIDER:[TOKEN:[[id number]],FOLLOWUP:[1 week]],FREE:[LAST:[Hepatology Clinic],FIRST:[Charlie],PHONE:[(   )    -],FAX:[(   )    -],ADDRESS:[[address] [place] [address]],FOLLOWUP:[1 week]]

## 2024-04-01 NOTE — DISCHARGE NOTE PROVIDER - HOSPITAL COURSE
HPI:  54 yo F, Etoh abuse, Denies PMH presenting for 4 days of nausea and vomiting. Patient states that she had NVD beginning 4 days ago with no inciting factors. States that 2 days following her symptoms, she began to vomit even more, this time with a more brown and bright red color huh. As she vomited more, she began to vomit clots of blood. She also noted to have diarrhea where she said it was more black/brown in nature. She is unclear if there was any bright red blood in her stool. Patient also states that as she began to vomit clots, she began to feel symptoms of palpitations, chills, dizziness. Patient states she came in today because she felt extremely weak. Of note, patient has 35PY history, and drank 8 shots daily for 30 years, with most recent drink 1.5 years ago. In ED, patient was found to have BP in the 70s systolic. HGH of 5.0. Patient currently receiving 1 unit packed blood cells.     In the ED pts VS were T(F): 98 HR: 98 BP: 88/46 RR: 18 SpO2: 97%  Labs show: H.0   WBC: 27  Plt:129  Creatinine: .96 Lactate 4.1 K 3.2     CT A/P:  Cirrhosis with findings of portal hypertension.    Heterogeneous liver containing multiple poorly defined hypodensities.   Consider nonemergent MR to exclude hepatocellular malignancy or   metastasis.    Hepatosplenomegaly.    Mild bladder wall thickening, difficult to assess secondary to inadequate   distention. Correlate with urinalysis and lab values to assess for   cystitis and/or ascending urinary tract infection.      S/p: 1 unit pRBC, Zofran 8mg, 1g tylenol IV, 40mg pantoprazole IV, 1.5L NS    Pt is admitted for further workup and management   (30 Mar 2024 08:18)      ---  HOSPITAL COURSE: Admitted with UGIB suspected 2/2 variceal bleeding from hx of alcohol overuse. Started on octreotide and protonix gtt. Received 4u PRBC and 1u FFP during hospital course. Abdominal MRI showed ***. EGD performed showing ***    ---  CONSULTANTS:     ---  TIME SPENT:  I, the attending physician, was physically present for the key portions of the evaluation and management (E/M) service provided. The total amount of time spent reviewing the hospital notes, laboratory values, imaging findings, assessing/counseling the patient, discussing with consultant physicians, social work, nursing staff was -- minutes    ---  Primary care provider was made aware of plan for discharge:      [  ] NO     [  ] YES   HPI:  52 yo F, Etoh abuse, Denies PMH presenting for 4 days of nausea and vomiting. Patient states that she had NVD beginning 4 days ago with no inciting factors. States that 2 days following her symptoms, she began to vomit even more, this time with a more brown and bright red color huh. As she vomited more, she began to vomit clots of blood. She also noted to have diarrhea where she said it was more black/brown in nature. She is unclear if there was any bright red blood in her stool. Patient also states that as she began to vomit clots, she began to feel symptoms of palpitations, chills, dizziness. Patient states she came in today because she felt extremely weak. Of note, patient has 35PY history, and drank 8 shots daily for 30 years, with most recent drink 1.5 years ago. In ED, patient was found to have BP in the 70s systolic. HGH of 5.0. Patient currently receiving 1 unit packed blood cells.     In the ED pts VS were T(F): 98 HR: 98 BP: 88/46 RR: 18 SpO2: 97%  Labs show: H.0   WBC: 27  Plt:129  Creatinine: .96 Lactate 4.1 K 3.2     CT A/P:  Cirrhosis with findings of portal hypertension.    Heterogeneous liver containing multiple poorly defined hypodensities.   Consider nonemergent MR to exclude hepatocellular malignancy or   metastasis.    Hepatosplenomegaly.    Mild bladder wall thickening, difficult to assess secondary to inadequate   distention. Correlate with urinalysis and lab values to assess for   cystitis and/or ascending urinary tract infection.      S/p: 1 unit pRBC, Zofran 8mg, 1g tylenol IV, 40mg pantoprazole IV, 1.5L NS    Pt is admitted for further workup and management   (30 Mar 2024 08:18)      ---  HOSPITAL COURSE: Admitted with UGIB suspected 2/2 variceal bleeding from hx of alcohol overuse. Started on octreotide and protonix gtt, and PO carafate. Received 4u PRBC and 1u FFP during hospital course. Abdominal MRI showed ***. EGD performed showing ***. Urine culture negative, blood cultures NGTD.    ---  CONSULTANTS:   GI: Dr. Kebede  ID: Dr. Brunner  Heme/Onc: Dr. Hon Espinosa    ---  TIME SPENT:  I, the attending physician, was physically present for the key portions of the evaluation and management (E/M) service provided. The total amount of time spent reviewing the hospital notes, laboratory values, imaging findings, assessing/counseling the patient, discussing with consultant physicians, social work, nursing staff was -- minutes    ---  Primary care provider was made aware of plan for discharge:      [  ] NO     [  ] YES   HPI:  52 yo F, Etoh abuse, Denies PMH presenting for 4 days of nausea and vomiting. Patient states that she had NVD beginning 4 days ago with no inciting factors. States that 2 days following her symptoms, she began to vomit even more, this time with a more brown and bright red color huh. As she vomited more, she began to vomit clots of blood. She also noted to have diarrhea where she said it was more black/brown in nature. She is unclear if there was any bright red blood in her stool. Patient also states that as she began to vomit clots, she began to feel symptoms of palpitations, chills, dizziness. Patient states she came in today because she felt extremely weak. Of note, patient has 35PY history, and drank 8 shots daily for 30 years, with most recent drink 1.5 years ago. In ED, patient was found to have BP in the 70s systolic. HGH of 5.0. Patient currently receiving 1 unit packed blood cells.     In the ED pts VS were T(F): 98 HR: 98 BP: 88/46 RR: 18 SpO2: 97%  Labs show: H.0   WBC: 27  Plt:129  Creatinine: .96 Lactate 4.1 K 3.2     CT A/P:  Cirrhosis with findings of portal hypertension.    Heterogeneous liver containing multiple poorly defined hypodensities.   Consider nonemergent MR to exclude hepatocellular malignancy or   metastasis.    Hepatosplenomegaly.    Mild bladder wall thickening, difficult to assess secondary to inadequate   distention. Correlate with urinalysis and lab values to assess for   cystitis and/or ascending urinary tract infection.      S/p: 1 unit pRBC, Zofran 8mg, 1g tylenol IV, 40mg pantoprazole IV, 1.5L NS    Pt is admitted for further workup and management   (30 Mar 2024 08:18)      ---  HOSPITAL COURSE: Admitted with UGIB suspected 2/2 variceal bleeding from hx of alcohol overuse. Started on octreotide and protonix gtt x48hrs, and PO carafate. Received 4u PRBC and 1u FFP during hospital course. Abdominal MRI showed cirrhosis, portal HTN, fatty liver. EGD performed showing ***. Urine culture negative, blood cultures NGTD.    ---  CONSULTANTS:   GI: Dr. Kebede  ID: Dr. Brunner  Heme/Onc: Dr. Hon Espinosa    ---  TIME SPENT:  I, the attending physician, was physically present for the key portions of the evaluation and management (E/M) service provided. The total amount of time spent reviewing the hospital notes, laboratory values, imaging findings, assessing/counseling the patient, discussing with consultant physicians, social work, nursing staff was -- minutes    ---  Primary care provider was made aware of plan for discharge:      [  ] NO     [  ] YES   HPI:  54 yo F, Etoh abuse, Denies PMH presenting for 4 days of nausea and vomiting. Patient states that she had NVD beginning 4 days ago with no inciting factors. States that 2 days following her symptoms, she began to vomit even more, this time with a more brown and bright red color huh. As she vomited more, she began to vomit clots of blood. She also noted to have diarrhea where she said it was more black/brown in nature. She is unclear if there was any bright red blood in her stool. Patient also states that as she began to vomit clots, she began to feel symptoms of palpitations, chills, dizziness. Patient states she came in today because she felt extremely weak. Of note, patient has 35PY history, and drank 8 shots daily for 30 years, with most recent drink 1.5 years ago. In ED, patient was found to have BP in the 70s systolic. HGH of 5.0. Patient currently receiving 1 unit packed blood cells.     In the ED pts VS were T(F): 98 HR: 98 BP: 88/46 RR: 18 SpO2: 97%  Labs show: H.0   WBC: 27  Plt:129  Creatinine: .96 Lactate 4.1 K 3.2     CT A/P:  Cirrhosis with findings of portal hypertension.    Heterogeneous liver containing multiple poorly defined hypodensities.   Consider nonemergent MR to exclude hepatocellular malignancy or   metastasis.    Hepatosplenomegaly.    Mild bladder wall thickening, difficult to assess secondary to inadequate   distention. Correlate with urinalysis and lab values to assess for   cystitis and/or ascending urinary tract infection.      S/p: 1 unit pRBC, Zofran 8mg, 1g tylenol IV, 40mg pantoprazole IV, 1.5L NS    Pt is admitted for further workup and management   (30 Mar 2024 08:18)      ---  HOSPITAL COURSE: Admitted with UGIB suspected 2/2 variceal bleeding in the setting of hx ETOH use. Started on octreotide and protonix gtt x48hrs, and PO carafate. Received 4u PRBC and 1u FFP during hospital course. Abdominal MRI showed cirrhosis, portal HTN, fatty liver with duodenal wall thickening and periduodenal infiltration showing duodenitis vs peptic ulcer disease. EGD performed showing esophageal varices without bleeding, portal hypertensive gastropathy that was biopsied, and duodenitis. Urine and blood cultures with no growth. TTE performed with LVEF 67%. Patient was transitioned to Protonix IV BID and then to oral. Started on nadolol 20mg daily and oral prednisolone for acute alcoholic hepatitis.    ---  CONSULTANTS:   GI: Dr. Kebede  ID: Dr. Brunner  Heme/Onc: Dr. Hon Espinosa    ---  TIME SPENT:  I, the attending physician, was physically present for the key portions of the evaluation and management (E/M) service provided. The total amount of time spent reviewing the hospital notes, laboratory values, imaging findings, assessing/counseling the patient, discussing with consultant physicians, social work, nursing staff was -- minutes    ---  Primary care provider was made aware of plan for discharge:      [  ] NO     [  ] YES   HPI:  52 yo F, Etoh abuse, Denies PMH presenting for 4 days of nausea and vomiting. Patient states that she had NVD beginning 4 days ago with no inciting factors. States that 2 days following her symptoms, she began to vomit even more, this time with a more brown and bright red color huh. As she vomited more, she began to vomit clots of blood. She also noted to have diarrhea where she said it was more black/brown in nature. She is unclear if there was any bright red blood in her stool. Patient also states that as she began to vomit clots, she began to feel symptoms of palpitations, chills, dizziness. Patient states she came in today because she felt extremely weak. Of note, patient has 35PY history, and drank 8 shots daily for 30 years, with most recent drink 1.5 years ago. In ED, patient was found to have BP in the 70s systolic. HGH of 5.0. Patient currently receiving 1 unit packed blood cells.     In the ED pts VS were T(F): 98 HR: 98 BP: 88/46 RR: 18 SpO2: 97%  Labs show: H.0   WBC: 27  Plt:129  Creatinine: .96 Lactate 4.1 K 3.2     CT A/P:  Cirrhosis with findings of portal hypertension.    Heterogeneous liver containing multiple poorly defined hypodensities.   Consider nonemergent MR to exclude hepatocellular malignancy or   metastasis.    Hepatosplenomegaly.    Mild bladder wall thickening, difficult to assess secondary to inadequate   distention. Correlate with urinalysis and lab values to assess for   cystitis and/or ascending urinary tract infection.      S/p: 1 unit pRBC, Zofran 8mg, 1g tylenol IV, 40mg pantoprazole IV, 1.5L NS    Pt is admitted for further workup and management   (30 Mar 2024 08:18)      ---  HOSPITAL COURSE: Admitted with UGIB suspected 2/2 variceal bleeding in the setting of hx ETOH use. Started on octreotide and protonix gtt x48hrs, and PO carafate. Received 4u PRBC and 1u FFP during hospital course. Abdominal MRI showed cirrhosis, portal HTN, fatty liver with duodenal wall thickening and periduodenal infiltration showing duodenitis vs peptic ulcer disease. EGD performed showing esophageal varices without bleeding, portal hypertensive gastropathy that was biopsied, and duodenitis. Urine and blood cultures with no growth. TTE performed with LVEF 67%. Patient was transitioned to Protonix IV BID and then to oral. Started on nadolol 20mg daily and oral prednisolone for acute alcoholic hepatitis.    PHYSICAL EXAM ON DAY OF DISCHARGE:  T(C): 36.7 (24 @ 11:25), Max: 36.8 (24 @ 20:36)  HR: 60 (24 @ 11:25) (60 - 85)  BP: 94/56 (24 @ 11:25) (94/56 - 135/87)  RR: 18 (24 @ 11:25) (17 - 18)  SpO2: 95% (24 @ 11:25) (95% - 96%)  Wt(kg): --    Physical Exam:   GENERAL: NAD at rest  HEENT: +scleral icterus, moist mucous membranes  CHEST/LUNG: CTA b/l, no rales, wheezes, or rhonchi  HEART: RRR, S1, S2  ABDOMEN: BS+, soft, nontender, nondistended  MUSCULOSKELETAL: no edema, cyanosis, or calf tenderness  NEUROLOGIC: answers questions and follows commands appropriately    ---  CONSULTANTS:   GI: Dr. Kebede  ID: Dr. Brunner  Heme/Onc: Dr. Hon Espinosa    ---  TIME SPENT:  I, the attending physician, was physically present for the key portions of the evaluation and management (E/M) service provided. The total amount of time spent reviewing the hospital notes, laboratory values, imaging findings, assessing/counseling the patient, discussing with consultant physicians, social work, nursing staff was -- minutes    ---  Primary care provider was made aware of plan for discharge:      [  ] NO     [  ] YES   HPI:  52 yo F, Etoh abuse, Denies PMH presenting for 4 days of nausea and vomiting. Patient states that she had NVD beginning 4 days ago with no inciting factors. States that 2 days following her symptoms, she began to vomit even more, this time with a more brown and bright red color huh. As she vomited more, she began to vomit clots of blood. She also noted to have diarrhea where she said it was more black/brown in nature. She is unclear if there was any bright red blood in her stool. Patient also states that as she began to vomit clots, she began to feel symptoms of palpitations, chills, dizziness. Patient states she came in today because she felt extremely weak. Of note, patient has 35PY history, and drank 8 shots daily for 30 years, with most recent drink 1.5 years ago. In ED, patient was found to have BP in the 70s systolic. HGH of 5.0. Patient currently receiving 1 unit packed blood cells.     In the ED pts VS were T(F): 98 HR: 98 BP: 88/46 RR: 18 SpO2: 97%  Labs show: H.0   WBC: 27  Plt:129  Creatinine: .96 Lactate 4.1 K 3.2     CT A/P:  Cirrhosis with findings of portal hypertension.    Heterogeneous liver containing multiple poorly defined hypodensities.   Consider nonemergent MR to exclude hepatocellular malignancy or   metastasis.    Hepatosplenomegaly.    Mild bladder wall thickening, difficult to assess secondary to inadequate   distention. Correlate with urinalysis and lab values to assess for   cystitis and/or ascending urinary tract infection.      S/p: 1 unit pRBC, Zofran 8mg, 1g tylenol IV, 40mg pantoprazole IV, 1.5L NS    Pt is admitted for further workup and management   (30 Mar 2024 08:18)      ---  HOSPITAL COURSE: Admitted with UGIB  cause  of acute blood loss anemia suspected 2/2 variceal bleeding in the setting of hx ETOH use / jaundice . Started on octreotide and protonix gtt x48hrs, and PO Carafate. Received 4u PRBC and 1u FFP during hospital course. Abdominal MRI showed cirrhosis, portal HTN, fatty liver with duodenal wall thickening and periduodenal infiltration showing duodenitis vs peptic ulcer disease. EGD performed showing esophageal varices without bleeding, portal hypertensive gastropathy that was biopsied, and duodenitis. Urine and blood cultures with no growth. TTE performed with LVEF 67%. Patient was transitioned to Protonix IV BID and then to oral. Started on nadolol 20mg daily and oral prednisolone for acute alcoholic hepatitis. leucocytosis sec to steroid fu up  repeat cbc off steroid .   pt clear by gi dr sheikh wheat up out pt in 1 to 2wk.   PHYSICAL EXAM ON DAY OF DISCHARGE: 4/3/24   T(C): 36.7 (24 @ 11:25), Max: 36.8 (24 @ 20:36)  HR: 60 (24 @ 11:25) (60 - 85)  BP: 94/56 (24 @ 11:25) (94/56 - 135/87)  RR: 18 (24 @ 11:25) (17 - 18)  SpO2: 95% (24 @ 11:25) (95% - 96%)  Wt(kg): --    Physical Exam:   GENERAL: NAD at rest  HEENT: +scleral icterus, moist mucous membranes  CHEST/LUNG: CTA b/l, no rales, wheezes, or rhonchi  HEART: RRR, S1, S2 , no tachy   ABDOMEN: BS+, soft, nontender, nondistended  MUSCULOSKELETAL: no edema, cyanosis, or calf tenderness  NEUROLOGIC: answers questions and follows commands appropriately    ---  CONSULTANTS:   GI: Dr. Kebede  ID: Dr. Brunner  Heme/Onc: Dr. Hon Espinosa    ---  TIME SPENT:  I, the attending physician, was physically present for the key portions of the evaluation and management (E/M) service provided. The total amount of time spent reviewing the hospital notes, laboratory values, imaging findings, assessing/counseling the patient, discussing with consultant physicians, social work, nursing staff was 50 -- minutes

## 2024-04-02 LAB
ALBUMIN SERPL ELPH-MCNC: 2.7 G/DL — LOW (ref 3.3–5)
ALP SERPL-CCNC: 185 U/L — HIGH (ref 40–120)
ALT FLD-CCNC: 90 U/L — HIGH (ref 12–78)
ANION GAP SERPL CALC-SCNC: 11 MMOL/L — SIGNIFICANT CHANGE UP (ref 5–17)
APTT BLD: 34.2 SEC — SIGNIFICANT CHANGE UP (ref 24.5–35.6)
AST SERPL-CCNC: 179 U/L — HIGH (ref 15–37)
BASOPHILS # BLD AUTO: 0.03 K/UL — SIGNIFICANT CHANGE UP (ref 0–0.2)
BASOPHILS NFR BLD AUTO: 0.2 % — SIGNIFICANT CHANGE UP (ref 0–2)
BILIRUB SERPL-MCNC: 7 MG/DL — HIGH (ref 0.2–1.2)
BUN SERPL-MCNC: 9 MG/DL — SIGNIFICANT CHANGE UP (ref 7–23)
CALCIUM SERPL-MCNC: 7.7 MG/DL — LOW (ref 8.5–10.1)
CHLORIDE SERPL-SCNC: 102 MMOL/L — SIGNIFICANT CHANGE UP (ref 96–108)
CO2 SERPL-SCNC: 23 MMOL/L — SIGNIFICANT CHANGE UP (ref 22–31)
CREAT SERPL-MCNC: 0.59 MG/DL — SIGNIFICANT CHANGE UP (ref 0.5–1.3)
EGFR: 109 ML/MIN/1.73M2 — SIGNIFICANT CHANGE UP
EOSINOPHIL # BLD AUTO: 0.07 K/UL — SIGNIFICANT CHANGE UP (ref 0–0.5)
EOSINOPHIL NFR BLD AUTO: 0.5 % — SIGNIFICANT CHANGE UP (ref 0–6)
GLUCOSE SERPL-MCNC: 148 MG/DL — HIGH (ref 70–99)
HCT VFR BLD CALC: 25.9 % — LOW (ref 34.5–45)
HGB BLD-MCNC: 8.6 G/DL — LOW (ref 11.5–15.5)
IMM GRANULOCYTES NFR BLD AUTO: 1.7 % — HIGH (ref 0–0.9)
INR BLD: 1.5 RATIO — HIGH (ref 0.85–1.18)
LYMPHOCYTES # BLD AUTO: 0.8 K/UL — LOW (ref 1–3.3)
LYMPHOCYTES # BLD AUTO: 5.2 % — LOW (ref 13–44)
MAGNESIUM SERPL-MCNC: 2.5 MG/DL — SIGNIFICANT CHANGE UP (ref 1.6–2.6)
MCHC RBC-ENTMCNC: 30.3 PG — SIGNIFICANT CHANGE UP (ref 27–34)
MCHC RBC-ENTMCNC: 33.2 GM/DL — SIGNIFICANT CHANGE UP (ref 32–36)
MCV RBC AUTO: 91.2 FL — SIGNIFICANT CHANGE UP (ref 80–100)
MELD SCORE WITH DIALYSIS: 32 POINTS — SIGNIFICANT CHANGE UP
MELD SCORE WITHOUT DIALYSIS: 19 POINTS — SIGNIFICANT CHANGE UP
MONOCYTES # BLD AUTO: 0.77 K/UL — SIGNIFICANT CHANGE UP (ref 0–0.9)
MONOCYTES NFR BLD AUTO: 5 % — SIGNIFICANT CHANGE UP (ref 2–14)
NEUTROPHILS # BLD AUTO: 13.44 K/UL — HIGH (ref 1.8–7.4)
NEUTROPHILS NFR BLD AUTO: 87.4 % — HIGH (ref 43–77)
NRBC # BLD: 0 /100 WBCS — SIGNIFICANT CHANGE UP (ref 0–0)
PHOSPHATE SERPL-MCNC: 1.9 MG/DL — LOW (ref 2.5–4.5)
PLATELET # BLD AUTO: 112 K/UL — LOW (ref 150–400)
POTASSIUM SERPL-MCNC: 3.7 MMOL/L — SIGNIFICANT CHANGE UP (ref 3.5–5.3)
POTASSIUM SERPL-SCNC: 3.7 MMOL/L — SIGNIFICANT CHANGE UP (ref 3.5–5.3)
PROT SERPL-MCNC: 6.1 G/DL — SIGNIFICANT CHANGE UP (ref 6–8.3)
PROTHROM AB SERPL-ACNC: 17.3 SEC — HIGH (ref 9.5–13)
RBC # BLD: 2.84 M/UL — LOW (ref 3.8–5.2)
RBC # FLD: 20.8 % — HIGH (ref 10.3–14.5)
SODIUM SERPL-SCNC: 136 MMOL/L — SIGNIFICANT CHANGE UP (ref 135–145)
WBC # BLD: 15.37 K/UL — HIGH (ref 3.8–10.5)
WBC # FLD AUTO: 15.37 K/UL — HIGH (ref 3.8–10.5)

## 2024-04-02 PROCEDURE — 99232 SBSQ HOSP IP/OBS MODERATE 35: CPT

## 2024-04-02 PROCEDURE — 99233 SBSQ HOSP IP/OBS HIGH 50: CPT | Mod: GC

## 2024-04-02 RX ORDER — NADOLOL 80 MG/1
20 TABLET ORAL DAILY
Refills: 0 | Status: DISCONTINUED | OUTPATIENT
Start: 2024-04-02 | End: 2024-04-03

## 2024-04-02 RX ORDER — POTASSIUM PHOSPHATE, MONOBASIC POTASSIUM PHOSPHATE, DIBASIC 236; 224 MG/ML; MG/ML
30 INJECTION, SOLUTION INTRAVENOUS ONCE
Refills: 0 | Status: COMPLETED | OUTPATIENT
Start: 2024-04-02 | End: 2024-04-02

## 2024-04-02 RX ADMIN — Medication 1 PATCH: at 13:04

## 2024-04-02 RX ADMIN — Medication 1 GRAM(S): at 13:04

## 2024-04-02 RX ADMIN — NADOLOL 20 MILLIGRAM(S): 80 TABLET ORAL at 18:56

## 2024-04-02 RX ADMIN — Medication 1 GRAM(S): at 23:12

## 2024-04-02 RX ADMIN — Medication 1 PATCH: at 19:46

## 2024-04-02 RX ADMIN — Medication 1 GRAM(S): at 17:23

## 2024-04-02 RX ADMIN — Medication 40 MILLIGRAM(S): at 18:56

## 2024-04-02 RX ADMIN — POTASSIUM PHOSPHATE, MONOBASIC POTASSIUM PHOSPHATE, DIBASIC 83.33 MILLIMOLE(S): 236; 224 INJECTION, SOLUTION INTRAVENOUS at 13:04

## 2024-04-02 RX ADMIN — Medication 1 MILLIGRAM(S): at 13:04

## 2024-04-02 RX ADMIN — Medication 1 SPRAY(S): at 17:23

## 2024-04-02 RX ADMIN — PANTOPRAZOLE SODIUM 40 MILLIGRAM(S): 20 TABLET, DELAYED RELEASE ORAL at 17:23

## 2024-04-02 NOTE — PROGRESS NOTE ADULT - TIME-BASED BILLING (NON-CRITICAL CARE)
Time-based billing (NON-critical care)
Time-based billing (NON-critical care)
None
Time-based billing (NON-critical care)

## 2024-04-02 NOTE — PROGRESS NOTE ADULT - PROBLEM SELECTOR PLAN 5
CT: Heterogeneous liver containing multiple poorly defined hypodensities.  F/up Tumor markers -- heme/onc (Dr. Espinosa group) following, recs appre  Hepatitis panel negative  f/u MRI - resulted   Heme/onc consulted CT: Heterogeneous liver containing multiple poorly defined hypodensities.  F/up Tumor markers -- heme/onc (Dr. Espinosa group) following, recs appreciated   Hepatitis panel negative  f/u MRI - resulted

## 2024-04-02 NOTE — PROGRESS NOTE ADULT - ATTENDING COMMENTS
see below
53y F with PMHx of EtOH use disorder admitted for UGI bleed and severe symptomatic anemia. Patient with appropriate Hgb response. Feeling better. Orthostatic symptoms improving, although BP remains soft. To transfuse 1U of PRBC + plasma. Continue PPI gtt, carafate + octreotide gtt. D/C empiric CTX as per ID. Plan for EGD with GI tomorrow 4/1 - no medical objection to proposed procedure, medically optimized at present. Discussed with patient at bedside - flonase added d/t pt with chronic sinusitis and c/o post nasal drip. Follow up MRI Abd w/wo IC - performed.
see below

## 2024-04-02 NOTE — PROGRESS NOTE ADULT - ASSESSMENT
54yo F with PMHx of EtOH use disorder (reportedly drinking rarely now), alcoholic cirrhosis, p/w hematemesis, melena, generalized weakness admitted with acute UGIB and severe acute blood loss anemia.

## 2024-04-02 NOTE — PROGRESS NOTE ADULT - PROBLEM/PLAN-1
"Chief Complaint   Patient presents with     Pharyngitis     fever, cough-2 days        Initial /68  Pulse 95  Temp 101  F (38.3  C) (Tympanic)  Wt 130 lb (59 kg)  SpO2 97%  BMI 17.7 kg/m2 Estimated body mass index is 17.7 kg/(m^2) as calculated from the following:    Height as of 4/4/17: 5' 11.85\" (1.825 m).    Weight as of this encounter: 130 lb (59 kg).  Medication Reconciliation: complete     JENN PERAZA      "
DISPLAY PLAN FREE TEXT

## 2024-04-02 NOTE — PROGRESS NOTE ADULT - SUBJECTIVE AND OBJECTIVE BOX
All interim records and events noted.    comfortable in bed      MEDICATIONS  (STANDING):  acetaminophen   IVPB .. 1000 milliGRAM(s) IV Intermittent once  fluticasone propionate 50 MICROgram(s)/spray Nasal Spray 1 Spray(s) Both Nostrils two times a day  folic acid 1 milliGRAM(s) Oral daily  nadolol 20 milliGRAM(s) Oral daily  nicotine -   7 mG/24Hr(s) Patch 1 Patch Transdermal daily  pantoprazole  Injectable 40 milliGRAM(s) IV Push every 12 hours  prednisoLONE    3 mG/mL Solution (ORAPRED) 40 milliGRAM(s) Oral every 24 hours  sucralfate suspension 1 Gram(s) Oral every 6 hours    MEDICATIONS  (PRN):  acetaminophen     Tablet .. 650 milliGRAM(s) Oral every 6 hours PRN Temp greater or equal to 38C (100.4F), Mild Pain (1 - 3)  artificial  tears Solution 1 Drop(s) Both EYES two times a day PRN Dry Eyes  guaiFENesin Oral Liquid (Sugar-Free) 100 milliGRAM(s) Oral every 6 hours PRN Cough  melatonin 3 milliGRAM(s) Oral at bedtime PRN Insomnia  traMADol 25 milliGRAM(s) Oral every 4 hours PRN Moderate Pain (4 - 6)      Vital Signs Last 24 Hrs  T(C): 36.4 (02 Apr 2024 11:15), Max: 37 (01 Apr 2024 16:35)  T(F): 97.5 (02 Apr 2024 11:15), Max: 98.6 (01 Apr 2024 16:35)  HR: 77 (02 Apr 2024 11:15) (73 - 81)  BP: 116/66 (02 Apr 2024 11:15) (83/63 - 116/66)  BP(mean): --  RR: 18 (02 Apr 2024 11:15) (16 - 18)  SpO2: 95% (02 Apr 2024 11:15) (92% - 96%)    Parameters below as of 02 Apr 2024 11:15  Patient On (Oxygen Delivery Method): room air        PHYSICAL EXAM  General:  in no acute distress  Head: atraumatic, normocephalic  ENT: nose midline  Neck: supple, trachea midline  CV: S1 S2, regular rate and rhythm  Lungs: clear to auscultation, no wheezes/rhonchi  Abdomen: soft, nontender, bowel sounds present, bowel sounds normal  Skin: no significant increased ecchymosis/petechiae      LABS:             8.6    15.37 )-----------( 112      ( 04-02 @ 06:23 )             25.9                8.4    13.14 )-----------( 105      ( 04-01 @ 07:00 )             24.7                8.4    x     )-----------( x        ( 03-31 @ 22:40 )             24.0                7.1    14.56 )-----------( 107      ( 03-31 @ 06:25 )             20.5                7.3    15.53 )-----------( 101      ( 03-31 @ 01:16 )             21.3                6.5    16.13 )-----------( 96       ( 03-30 @ 16:15 )             18.7       04-02    136  |  102  |  9   ----------------------------<  148<H>  3.7   |  23  |  0.59    Ca    7.7<L>      02 Apr 2024 06:23  Phos  1.9     04-02  Mg     2.5     04-02    TPro  6.1  /  Alb  2.7<L>  /  TBili  7.0<H>  /  DBili  x   /  AST  179<H>  /  ALT  90<H>  /  AlkPhos  185<H>  04-02 04-02 @ 06:23  PT17.3 INR1.50  PTT34.2  04-01 @ 07:00  PT15.9 INR1.37  PTT32.8  03-31 @ 06:25  PT18.0 INR1.56  PTT33.2  03-30 @ 16:15  PT18.9 INR1.64  PTT34.3      RADIOLOGY & ADDITIONAL STUDIES:  < from: MR Abdomen w/wo IV Cont (03.31.24 @ 12:30) >    ACC: [id number] EXAM:  MR ABDOMEN WAW IC   ORDERED BY: PERRI PARIS     PROCEDURE DATE:  03/31/2024          INTERPRETATION:  CLINICAL INFORMATION: Abnormal CT. Evaluate liver.    COMPARISON: CT 3/30/2024    CONTRAST/COMPLICATIONS:  IV Contrast: Gadavist  7 cc administered   0.5 cc discarded  Oral Contrast: NONE  Complications: None reported at time of study completion    PROCEDURE:  MRI of the abdomen was performed.  MRCP was performed.    FINDINGS:  LOWER CHEST: Trace pleural effusions.    LIVER: Cirrhosis with hepatomegaly. Diffuse heterogeneous hepatic   steatosis. Heterogeneous arterial phase enhancement throughout the liver   is likely perfusional in etiology. Multifocal hyperenhancement in the   right hepatic lobe predominantly involving segment 5 with associated mild   diffusion restriction, possibly inflammatory in etiology. No suspicious   focal liver lesions. No findings to suggest iron overload.  BILE DUCTS: Normal caliber.  GALLBLADDER: Gallbladder wall edema, likely related to portal hypertension  SPLEEN: Splenomegaly.  PANCREAS: Normal signal and enhancement. No ductal dilatation.  ADRENALS: Within normal limits.  KIDNEYS/URETERS: Within normal limits.    VISUALIZED PORTIONS:  BOWEL: Mild duodenal wall thickening and periduodenal stranding, possibly   representing acute duodenitis or peptic ulcer disease.  PERITONEUM: Trace ascites.  VESSELS: Patent portal and hepatic veins. Patent mesenteric vasculature.   Normal caliber abdominal aorta.  RETROPERITONEUM/LYMPH NODES: No lymphadenopathy.  ABDOMINAL WALL: Within normal limits.  BONES: Within normal limits.    IMPRESSION:  Cirrhosis with sequela of portal hypertension as well as diffuse   heterogeneous hepatic steatosis. Suspected inflammatory changes involving   hepatic segment 5. Correlate with liver enzymes. No suspicious focal   liver lesions. No evidence of iron overload.    Mild duodenal wall thickening with periduodenal infiltration, possibly   representing acute duodenitis or peptic ulcer disease. Correlate with   patient's symptoms.          < end of copied text >      IMPRESSION/RECOMMENDATIONS:

## 2024-04-02 NOTE — CASE MANAGEMENT PROGRESS NOTE - NSCMPROGRESSNOTE_GEN_ALL_CORE
Discussed pt in rounds, Pt s/p EGD. Remains acute,  Receiving IV Protonix, per MD to be started on new medications today.  H/H 8.6/25.9, WBC 15.37

## 2024-04-02 NOTE — PROGRESS NOTE ADULT - SUBJECTIVE AND OBJECTIVE BOX
[facility]  Dequan Guerra PA-C  [address] [place] [address]  [phone]      INTERVAL HPI/OVERNIGHT EVENTS:  Pt s/e  S/p egd   Tolerating diet  No abdominal pain or GI complaints    MEDICATIONS  (STANDING):  acetaminophen   IVPB .. 1000 milliGRAM(s) IV Intermittent once  fluticasone propionate 50 MICROgram(s)/spray Nasal Spray 1 Spray(s) Both Nostrils two times a day  folic acid 1 milliGRAM(s) Oral daily  nadolol 20 milliGRAM(s) Oral daily  nicotine -   7 mG/24Hr(s) Patch 1 Patch Transdermal daily  pantoprazole  Injectable 40 milliGRAM(s) IV Push every 12 hours  potassium phosphate IVPB 30 milliMole(s) IV Intermittent once  prednisoLONE    3 mG/mL Solution (ORAPRED) 40 milliGRAM(s) Oral every 24 hours  sucralfate suspension 1 Gram(s) Oral every 6 hours    MEDICATIONS  (PRN):  acetaminophen     Tablet .. 650 milliGRAM(s) Oral every 6 hours PRN Temp greater or equal to 38C (100.4F), Mild Pain (1 - 3)  artificial  tears Solution 1 Drop(s) Both EYES two times a day PRN Dry Eyes  guaiFENesin Oral Liquid (Sugar-Free) 100 milliGRAM(s) Oral every 6 hours PRN Cough  melatonin 3 milliGRAM(s) Oral at bedtime PRN Insomnia  traMADol 25 milliGRAM(s) Oral every 4 hours PRN Moderate Pain (4 - 6)      Allergies  No Known Allergies    PHYSICAL EXAM:   Vital Signs:  Vital Signs Last 24 Hrs  T(C): 36.4 (2024 11:15), Max: 37 (2024 16:35)  T(F): 97.5 (2024 11:15), Max: 98.6 (2024 16:35)  HR: 77 (2024 11:15) (73 - 81)  BP: 116/66 (2024 11:15) (83/63 - 116/66)  BP(mean): --  RR: 18 (2024 11:15) (16 - 18)  SpO2: 95% (2024 11:15) (92% - 96%)    Parameters below as of 2024 11:15  Patient On (Oxygen Delivery Method): room air      Daily     Daily Weight in k (2024 04:40)    GENERAL:  Appears stated age  HEENT:  NC/AT  CHEST:  Full & symmetric excursion  HEART:  Regular rhythm  ABDOMEN:  Soft, non-tender, non-distended  EXTEREMITIES:  no cyanosis  SKIN:  No rash  NEURO:  Alert      LABS:                        8.6    15.37 )-----------( 112      ( 2024 06:23 )             25.9     04-02    136  |  102  |  9   ----------------------------<  148<H>  3.7   |  23  |  0.59    Ca    7.7<L>      2024 06:23  Phos  1.9     04-02  Mg     2.5     04-02    TPro  6.1  /  Alb  2.7<L>  /  TBili  7.0<H>  /  DBili  x   /  AST  179<H>  /  ALT  90<H>  /  AlkPhos  185<H>  04-02    PT/INR - ( 2024 06:23 )   PT: 17.3 sec;   INR: 1.50 ratio         PTT - ( 2024 06:23 )  PTT:34.2 sec  Urinalysis Basic - ( 2024 06:23 )    Color: x / Appearance: x / SG: x / pH: x  Gluc: 148 mg/dL / Ketone: x  / Bili: x / Urobili: x   Blood: x / Protein: x / Nitrite: x   Leuk Esterase: x / RBC: x / WBC x   Sq Epi: x / Non Sq Epi: x / Bacteria: x

## 2024-04-02 NOTE — PROGRESS NOTE ADULT - PROBLEM SELECTOR PLAN 2
- CT scan illustrating cirrhosis + portal hypertension  - likely alcoholic cirrhosis  - MELD score 19  - Maddrey's Discrimination Function Score 34.6, trending bilirubin 4.3 ->5.7 -> 7.6 -> 7.0  - GI (Dr. Beard), recs appreciated -- GI rec to start prednisolone tonight as pt admitted to drinking alcohol within past 1-2 weeks and GI suspects pt having acute alcoholic hepatitis  -  MRI abdomen w/wo IV contrast to better assess liver - cirrhosis w/ portal HTN, hepatic steatosis, inflammatory changes, no suspicious focal liver lesions  - Tumor markers resulted  - pt denies daily alcohol use and reports no withdrawal symptoms -- will monitor closely and if signs of withdrawal will initiate CIWA  - start prednisolone  - start nadolol - CT scan illustrating cirrhosis + portal hypertension  - likely alcoholic cirrhosis  - MELD score 19  - Maddrey's Discrimination Function Score 34.6 today, trending bilirubin 4.3 ->5.7 -> 7.6 -> 7.0  - GI (Dr. Beard), recs appreciated -- GI rec to start prednisolone on evening of 4/1 and was started that day -- as pt admitted to drinking alcohol within past 1-2 weeks and GI suspects pt having acute alcoholic hepatitis  -  MRI abdomen w/wo IV contrast to better assess liver - cirrhosis w/ portal HTN, hepatic steatosis, inflammatory changes, no suspicious focal liver lesions  - the inflammatory changes suspected due to acute alcoholic hepatitis   - Tumor markers -- heme/onc (Dr. Espinosa [facility]), recs appreciated   - pt denies daily alcohol use and reports no withdrawal symptoms -- will monitor closely and if signs of withdrawal will initiate CIWA  - c/w prednisolone 40mg po daily  - start nadolol 20mg po daily today and monitor how pt's VS tolerating

## 2024-04-02 NOTE — PROGRESS NOTE ADULT - PROBLEM SELECTOR PLAN 4
- doubt pt was septic or had gastroenteritis -- likely symptoms due to UGIB  - lactic acidosis and leukocytosis likely related to acute UGIB w/ severe acute blood loss anemia   - lactate normalized with PRBC / fluid resuscitation   - blood cx, urine cx NGTD  - GI PCR pending  - leukocytosis improving   - elevated CRP, ESR  - monitor off abx per ID  - ID (Dr. Prince), recs appreciated

## 2024-04-02 NOTE — PROGRESS NOTE ADULT - ASSESSMENT
54 yo woman w cirrhosis, Ferritin 1000, initial CT w liver hypodensities and MRI was recommended as can't r/o mets    -high Ferritin due to liver ds  -pancytopenia due to cirrhosis, splenomegaly, no intervention needed at present levels  -MRI liver show cirrhosis w assoc sequelae, no liver lesions, no iron overload, splenomegaly    will sign off for now, please call if any questions

## 2024-04-02 NOTE — PROGRESS NOTE ADULT - PROBLEM SELECTOR PLAN 1
- acute UGIB; multiple bouts of bloody emesis, vomiting clots of blood as per patient as outpt. Last emesis prior to admission. Also reported melena prior to admission.   - CT illustrating cirrhosis + portal hypertension; -- pt with significant alcohol consumption hx, there was concern bloody emesis may be 2/2 variceal bleeding  - Hgb 5.0 on admission, maintain Hgb>7  - leukocytosis, downtrending, likely reactive  - at risk for decompensation, low BPs  - s/p octreotide ggt   - on Pantoprazole ggt  - EGD shows non-bleeding esophageal varices, portal hypertensive gastropathy and duodenitis. MRI abdomen resulted this AM showing cirrhotic liver with portal hypertension, hepatic steatosis and inflammatory changes, w/o suspicious focal liver lesions.   - troponin downtrending from 156 - 146; EKG shows no ST elevations, likely demand ischemia from acute blood loss anemia, monitor  - GI consulted, recs appreciated  - no need for rocephin for SBP ppx per ID given pt has no significant ascites -- ID (Dr. Prince), recs appreciated   - leukocytosis present, likely reactive, trend - acute UGIB; multiple bouts of bloody emesis, vomiting clots of blood as per patient as outpt. Last emesis prior to admission. Also, reported melena prior to admission.   - CT illustrating cirrhosis + portal hypertension; -- pt with significant alcohol consumption hx, there was concern bloody emesis may be 2/2 variceal bleeding  - Hgb 5.0 on admission, maintain Hgb>7  - leukocytosis, downtrending, likely reactive  - at risk for decompensation, low BPs  - s/p octreotide ggt, discontinued after EGD yesterday per GI recs  - c/w protonix 40mg IV BID for now  - EGD on 4/1 showed non-bleeding, small esophageal varices, portal hypertensive gastropathy and duodenitis.   - MRI abdomen resulted this AM showing cirrhotic liver with portal hypertension, hepatic steatosis and inflammatory changes, w/o suspicious focal liver lesions.   - troponin downtrended from 156 -> 146; EKG showed no ST elevations -- pt likely demand ischemia from acute blood loss anemia, monitor  - GI (Dr. Beard), recs appreciated  - no need for rocephin for SBP ppx per ID given pt has no significant ascites -- ID (Dr. Prince), recs appreciated   - leukocytosis present, likely reactive, monitor CBC

## 2024-04-02 NOTE — PROGRESS NOTE ADULT - TIME BILLING
activities including direct patient care, counseling and/or coordinating care, reviewing notes/lab data/imaging, and discussion with multidisciplinary team (excluding time spent on resident teaching)
Pt seen + examined on 4/2. Case discussed with resident. Agree with assessment and plan above (edited by me in detail):  Time spent: 51min. Time spent discussing/coordinating care with consultants, CM/SW team, and counseling the patient on medical condition -- acute UGIB and severe acute blood loss anemia, portal gastropathy, suspected acute alcoholic hepatitis, prednisolone, small varices, nadolol if able to tolerate, cirrhosis, liver abnormalities on CT, MRI for further information, hypophosphatemia.     52yo F with PMHx of EtOH use disorder (reportedly drinking rarely now), alcoholic cirrhosis, p/w hematemesis, melena, generalized weakness admitted with acute UGIB and severe acute blood loss anemia.    - acute UGIB; multiple bouts of bloody emesis, vomiting clots of blood as per patient as outpt. Last emesis prior to admission. Also, reported melena prior to admission.   - CT illustrating cirrhosis + portal hypertension; -- pt with significant alcohol consumption hx, there was concern bloody emesis may be 2/2 variceal bleeding  - Hgb 5.0 on admission, maintain Hgb>7  - leukocytosis, downtrending, likely reactive  - at risk for decompensation, low BPs  - s/p octreotide ggt, discontinued after EGD yesterday per GI recs  - c/w protonix 40mg IV BID for now  - EGD on 4/1 showed non-bleeding, small esophageal varices, portal hypertensive gastropathy and duodenitis.   - MRI abdomen resulted this AM showing cirrhotic liver with portal hypertension, hepatic steatosis and inflammatory changes, w/o suspicious focal liver lesions.   - troponin downtrended from 156 -> 146; EKG showed no ST elevations -- pt likely demand ischemia from acute blood loss anemia, monitor  - GI (Dr. Beard), recs appreciated  - no need for rocephin for SBP ppx per ID given pt has no significant ascites -- ID (Dr. Prince), recs appreciated   - leukocytosis present, likely reactive, monitor CBC    - CT scan illustrating cirrhosis + portal hypertension  - likely alcoholic cirrhosis  - MELD score 19  - Maddrey's Discrimination Function Score 34.6 today, trending bilirubin 4.3 ->5.7 -> 7.6 -> 7.0  - GI (Dr. Beard), recs appreciated -- GI rec to start prednisolone on evening of 4/1 and was started that day -- as pt admitted to drinking alcohol within past 1-2 weeks and GI suspects pt having acute alcoholic hepatitis  -  MRI abdomen w/wo IV contrast to better assess liver - cirrhosis w/ portal HTN, hepatic steatosis, inflammatory changes, no suspicious focal liver lesions  - the inflammatory changes suspected due to acute alcoholic hepatitis   - Tumor markers -- heme/onc (Dr. Espinosa [facility]), recs appreciated   - pt denies daily alcohol use and reports no withdrawal symptoms -- will monitor closely and if signs of withdrawal will initiate CIWA  - c/w prednisolone 40mg po daily  - start nadolol 20mg po daily today and monitor how pt's VS tolerating    - acute blood loss anemia 2/2 UGIB  - hgb 5.0 on admission  - s/p 3 Units PRBC in ED  - AM Hgb of 8.6 today, seems to be stabilized with Hgb ~8.5 now - continue to monitor
Pt seen + examined on 4/1. Case discussed with resident. Agree with assessment and plan above (edited by me in detail):  Time spent: 51min. Time spent discussing/coordinating care with consultants, CM/SW team, and counseling the patient on medical condition -- acute UGIB and severe acute blood loss anemia, portal gastropathy, suspected acute alcoholic hepatitis, prednisolone, small varices, nadolol if able to tolerate, cirrhosis, liver abnormalities on CT, MRI for further information.     54yo F with PMHx of EtOH use disorder (reportedly drinking rarely now), alcoholic cirrhosis, p/w hematemesis, melena, generalized weakness admitted with acute UGIB and severe acute blood loss anemia.    - acute UGIB; multiple bouts of bloody emesis, vomiting clots of blood as per patient as outpt. Last emesis prior to admission. Also reported melena prior to admission.   - CT illustrating cirrhosis + portal hypertension  - pt with significant alcohol consumption hx, there was concern bloody emesis may be 2/2 variceal bleeding  - Hgb 5.0 on admission, maintain Hgb>7  - leukocytosis, downtrending, likely reactive  - at risk for decompensation, low BPs, hold off BB  - f/u MRI abdomen w/wo IV contrast  - on octreotide ggt   - on Pantoprazole ggt  - NPO for EGD today  ---- pt found to have small varices not bleeding, significant portal hypertensive gastropathy which is suspected cause of bleeding -- per GI, rec to d/c octreotide gtt, c/w PPI BID, start nadolol tomorrow if BP can tolerate  - troponin downtrending from 156 - 146; EKG shows no ST elevations, likely demand ischemia from acute blood loss anemia, monitor  - no need for rocephin for SBP ppx per ID given pt has no significant ascites -- ID (Dr. Prince), recs appreciated   - leukocytosis rapidly improving    - CT scan illustrating cirrhosis + portal hypertension  - likely alcoholic cirrhosis  - MELD score 19  - Maddrey's Discrimination Function Score 28.8, but rising bilirubin 4.3 ->5.7 -> 7.6  - GI (Dr. Beard), recs appreciated -- GI rec to start prednisolone tonight as pt admitted to drinking alcohol within past 1-2 weeks and GI suspects pt having acute alcoholic hepatitis  - f/u MRI abdomen w/wo IV contrast to better assess liver  - pt denies daily alcohol use and reports no withdrawal symptoms -- will monitor closely and if signs of withdrawal will initiate CIWA    - acute blood loss anemia 2/2 UGIB  - hgb 5.0 on admission  - s/p 1.5 L fluid  - s/p 3 Units PRBC in ED  - AM Hgb of 8.4, trend; appears to have stabilized   - maintain Hgb>7  - Maintain active type and screen  - f/u Hemochromatosis gene mutation lab, elevated ferritin  - Heme onc (Dr. Espinosa group) consulted, recs appreciated    - doubt pt was septic or had gastroenteritis -- likely symptoms due to UGIB  - lactic acidosis and leukocytosis likely related to acute UGIB w/ severe acute blood loss anemia   - lactate normalized with PRBC / fluid resuscitation   - blood cx, urine cx NGTD  - GI PCR pending  - leukocytosis rapidly improving   - elevated CRP, ESR  - monitor off abx per ID  - ID (Dr. Prince), recs appreciated

## 2024-04-02 NOTE — PROGRESS NOTE ADULT - PROBLEM SELECTOR PLAN 3
- acute blood loss anemia 2/2 UGIB  - hgb 5.0 on admission  - s/p 1.5 L fluid  - s/p 3 Units PRBC in ED  - AM Hgb of 8.6, trend; appears to have stabilized   - maintain Hgb>7  - Maintain active type and screen  - f/u Hemochromatosis gene mutation lab, elevated ferritin  - Heme onc (Dr. Espinosa [facility]) consulted, recs appreciated - acute blood loss anemia 2/2 UGIB  - hgb 5.0 on admission  - s/p 1.5 L fluid  - s/p 3 Units PRBC in ED  - AM Hgb of 8.6 today, seems to be stabilized with Hgb ~8.5 now - continue to monitor  - maintain Hgb>7  - Maintain active type and screen  - elevated ferritin, no sign of hemochromatosis on MRI  - Heme onc (Dr. Espinosa [facility]) consulted, recs appreciated

## 2024-04-02 NOTE — PROGRESS NOTE ADULT - PROBLEM SELECTOR PLAN 6
scd for dvt ppx given bleed  replete lala VTE ppx: c/w SCDs given bleed      #Hypophosphatemia:  - will give another dose of KPhos 30mmol x1  - monitor lytes

## 2024-04-02 NOTE — PROGRESS NOTE ADULT - SUBJECTIVE AND OBJECTIVE BOX
[facility]   INFECTIOUS DISEASES   [address] [place] [address]  Tel: [phone]     Fax: [phone]  =========================================================  MD Harpreet Pearson Kaushal, MD Cho, Michelle, MD Sunjit, Jaspal, MD  =========================================================    [employer]DRICH [id number]    Follow up: GI bleed    Lying in bed, no complaint, no fever.   Had EGD yesterday. On diet tolerating.     Allergies:  No Known Allergies    acetaminophen     Tablet .. 650 milliGRAM(s) Oral every 6 hours PRN  acetaminophen   IVPB .. 1000 milliGRAM(s) IV Intermittent once  artificial  tears Solution 1 Drop(s) Both EYES two times a day PRN  fluticasone propionate 50 MICROgram(s)/spray Nasal Spray 1 Spray(s) Both Nostrils two times a day  folic acid 1 milliGRAM(s) Oral daily  guaiFENesin Oral Liquid (Sugar-Free) 100 milliGRAM(s) Oral every 6 hours PRN  lactated ringers 1000 milliLiter(s) IV Continuous <Continuous>  melatonin 3 milliGRAM(s) Oral at bedtime PRN  nicotine -   7 mG/24Hr(s) Patch 1 Patch Transdermal daily  octreotide  Infusion 50 MICROgram(s)/Hr IV Continuous <Continuous>  pantoprazole Infusion 8 mG/Hr IV Continuous <Continuous>  sucralfate suspension 1 Gram(s) Oral every 6 hours  traMADol 25 milliGRAM(s) Oral every 4 hours PRN     REVIEW OF SYSTEMS:  CONSTITUTIONAL:  No Fever or chills  HEENT:   No diplopia or blurred vision.  No earache, sore throat or runny nose.  CARDIOVASCULAR:  No chest pain  RESPIRATORY:  No cough, shortness of breath, PND or orthopnea.  GASTROINTESTINAL:  No nausea, vomiting or diarrhea.  GENITOURINARY:  No dysuria, frequency or urgency. No Blood in urine  MUSCULOSKELETAL:  no joint aches, no muscle pain  SKIN:  No change in skin, hair or nails.     Physical Exam:  Vital Signs Last 24 Hrs  T(C): 36.4 (02 Apr 2024 11:15), Max: 37 (01 Apr 2024 16:35)  T(F): 97.5 (02 Apr 2024 11:15), Max: 98.6 (01 Apr 2024 16:35)  HR: 77 (02 Apr 2024 11:15) (73 - 81)  BP: 116/66 (02 Apr 2024 11:15) (83/63 - 116/66)  RR: 18 (02 Apr 2024 11:15) (16 - 18)  SpO2: 95% (02 Apr 2024 11:15) (92% - 96%)  Parameters below as of 02 Apr 2024 11:15  Patient On (Oxygen Delivery Method): room air  GEN: NAD  HEENT: normocephalic and atraumatic. EOMI. RUI.    NECK: Supple.  No lymphadenopathy   LUNGS: Clear to auscultation.  HEART: Regular rate and rhythm   ABDOMEN: Soft, nontender, and nondistended.    : No CVA tenderness  EXTREMITIES: Without edema.  MSK: no joint swelling  NEUROLOGIC: grossly intact.  PSYCHIATRIC: Appropriate affect .  SKIN: No rash     Labs:                        8.6    15.37 )-----------( 112      ( 02 Apr 2024 06:23 )             25.9     04-02    136  |  102  |  9   ----------------------------<  148<H>  3.7   |  23  |  0.59    Ca    7.7<L>      02 Apr 2024 06:23  Phos  1.9     04-02  Mg     2.5     04-02    TPro  6.1  /  Alb  2.7<L>  /  TBili  7.0<H>  /  DBili  x   /  AST  179<H>  /  ALT  90<H>  /  AlkPhos  185<H>  04-02    Culture - Urine (collected 03-30-24 @ 09:52)  Source: Clean Catch Clean Catch (Midstream)  Final Report (03-31-24 @ 12:32):    No growth    Culture - Blood (collected 03-30-24 @ 06:10)  Source: .Blood Blood-Peripheral  Preliminary Report (04-02-24 @ 13:01):    No growth at 72 Hours    Culture - Blood (collected 03-30-24 @ 06:05)  Source: .Blood Blood-Peripheral  Preliminary Report (04-02-24 @ 13:01):    No growth at 72 Hours    WBC Count: 15.37 K/uL (04-02-24 @ 06:23)  WBC Count: 13.14 K/uL (04-01-24 @ 07:00)  WBC Count: 14.56 K/uL (03-31-24 @ 06:25)  WBC Count: 15.53 K/uL (03-31-24 @ 01:16)  WBC Count: 16.13 K/uL (03-30-24 @ 16:15)  WBC Count: 27.13 K/uL (03-30-24 @ 04:59)    Creatinine: 0.59 mg/dL (04-02-24 @ 06:23)  Creatinine: 0.67 mg/dL (04-01-24 @ 07:00)  Creatinine: 0.73 mg/dL (03-30-24 @ 16:15)  Creatinine: 0.96 mg/dL (03-30-24 @ 04:59)    C-Reactive Protein, Serum: 54 mg/L (03-30-24 @ 16:15)    Ferritin: 1237 ng/mL (03-30-24 @ 16:15)    Sedimentation Rate, Erythrocyte: 42 mm/hr (03-30-24 @ 16:15)    All imaging and data are reviewed.   < from: CT Abdomen and Pelvis w/ IV Cont (03.30.24 @ 06:11) >  IMPRESSION:  Cirrhosis with findings of portal hypertension.  Heterogeneous liver containing multiple poorly defined hypodensities.   Consider nonemergent MR to exclude hepatocellular malignancy or   metastasis.  Hepatosplenomegaly.  Mild bladder wall thickening, difficult to assess secondary to inadequate   distention. Correlate with urinalysis and lab values to assess for   cystitis and/or ascending urinary tract infection.    Assessment and Plan:   53y  Female with h/o  Etoh abuse, active smoker. Patient presented with 4 days of nausea and vomiting. Symptoms worsened and started to have hematemesis and blood stools and was associated with palpitations, chills, dizziness. Came to the ER once symptoms worsened and was noted to be hypotensive, Hb of 5. Patient was given PRBC infusions, seen by GI and currently being managed for GI bleed. Also noted to have leukocytosis that most likely is reactive to bleeding.   Now with small increase in Troponin.   s/p EGD on 4/1 with varices and some inflammation     # Acute GI Bleed  # Leukocytosis   # Transaminitis     - Blood cultures NGTD  - UA and urine Cx negative   - CT A/P reporting Cirrhosis with findings of portal hypertension. Hepatosplenomegaly.  - Monitor WBC coming down 27-->15k reactive  - Follow Tmax, LFTs   - Watch off antibiotics     Will sign off please call with any question.     Keturah Prince MD  Division of Infectious Diseases   Please call ID service at [phone] with any question.      50 minutes spent on total encounter assessing patient, examination, chart review, counseling and coordinating care by the attending physician/nurse/care manager.

## 2024-04-02 NOTE — PROGRESS NOTE ADULT - ASSESSMENT
anemia  cirrhosis  'gi bleed    s/p egd 4/1; varices and duodenitis  Follow up MRI  Steroids  Nadolol  PPI  Completed octreotide  EtOH cessation  Needs hepatology follow up  D/w pt  Will follow    I reviewed the overnight course of events on the unit, re-confirming the patient history. I discussed the care with the patient  Differential diagnosis and plan of care discussed with patient after the evaluation  35 minutes spent on total encounter of which more than fifty percent of the encounter was spent counseling and/or coordinating care by the attending physician.

## 2024-04-02 NOTE — PROGRESS NOTE ADULT - SUBJECTIVE AND OBJECTIVE BOX
Patient is a 53y old Female who presents with a chief complaint of Anemia/gastroenteritis (01 Apr 2024 15:43)      INTERVAL HPI/OVERNIGHT EVENTS:  No acute events overnight. Upon approach this AM, patient is pleasant, sitting up in bed watching TV, and denies any acute concerns. She reports the heaviness in her chest from yesterday has resolved completely. Denies any N/V/D, fever, chills, CP, SOB. She has not yet had a BM.     EGD yesterday demonstrated non-bleeding esophageal varices, portal hypertensive gastropathy and duodenitis. MRI abdomen resulted this AM showing cirrhotic liver with portal hypertension, hepatic steatosis and inflammatory changes, w/o suspicious focal liver lesions.     MEDICATIONS  (STANDING):  acetaminophen   IVPB .. 1000 milliGRAM(s) IV Intermittent once  fluticasone propionate 50 MICROgram(s)/spray Nasal Spray 1 Spray(s) Both Nostrils two times a day  folic acid 1 milliGRAM(s) Oral daily  nadolol 20 milliGRAM(s) Oral daily  nicotine -   7 mG/24Hr(s) Patch 1 Patch Transdermal daily  pantoprazole  Injectable 40 milliGRAM(s) IV Push every 12 hours  potassium phosphate IVPB 30 milliMole(s) IV Intermittent once  prednisoLONE    3 mG/mL Solution (ORAPRED) 40 milliGRAM(s) Oral every 24 hours  sucralfate suspension 1 Gram(s) Oral every 6 hours    MEDICATIONS  (PRN):  acetaminophen     Tablet .. 650 milliGRAM(s) Oral every 6 hours PRN Temp greater or equal to 38C (100.4F), Mild Pain (1 - 3)  artificial  tears Solution 1 Drop(s) Both EYES two times a day PRN Dry Eyes  guaiFENesin Oral Liquid (Sugar-Free) 100 milliGRAM(s) Oral every 6 hours PRN Cough  melatonin 3 milliGRAM(s) Oral at bedtime PRN Insomnia  traMADol 25 milliGRAM(s) Oral every 4 hours PRN Moderate Pain (4 - 6)      Allergies    No Known Allergies    Intolerances        REVIEW OF SYSTEMS:  CONSTITUTIONAL: No fever or chills  HEENT: No headache, no sore throat  RESPIRATORY: No cough, wheezing, or shortness of breath  CARDIOVASCULAR: No chest pain, palpitations  GASTROINTESTINAL: No abd pain, nausea, vomiting, or diarrhea  GENITOURINARY: No dysuria, frequency, or hematuria  NEUROLOGICAL: no focal weakness or dizziness  MUSCULOSKELETAL: no myalgias       Vital Signs Last 24 Hrs  T(C): 36.4 (02 Apr 2024 11:15), Max: 37 (01 Apr 2024 16:35)  T(F): 97.5 (02 Apr 2024 11:15), Max: 98.6 (01 Apr 2024 16:35)  HR: 77 (02 Apr 2024 11:15) (73 - 81)  BP: 116/66 (02 Apr 2024 11:15) (83/63 - 116/66)  BP(mean): --  RR: 18 (02 Apr 2024 11:15) (16 - 18)  SpO2: 95% (02 Apr 2024 11:15) (90% - 96%)    Parameters below as of 02 Apr 2024 11:15  Patient On (Oxygen Delivery Method): room air        PHYSICAL EXAM:  GENERAL: NAD, well-appearing  HEENT: NCAT, anicteric, moist mucous membranes  CHEST/LUNG: CTA b/l, no rales, wheezes, or rhonchi  HEART: RRR, S1, S2  ABDOMEN: soft, nontender, nondistended  MUSCULOSKELETAL: no edema, cyanosis, or calf tenderness  NEUROLOGIC: answers questions and follows commands appropriately      LABS:                        8.6    15.37 )-----------( 112      ( 02 Apr 2024 06:23 )             25.9     CBC Full  -  ( 02 Apr 2024 06:23 )  WBC Count : 15.37 K/uL  Hemoglobin : 8.6 g/dL  Hematocrit : 25.9 %  Platelet Count - Automated : 112 K/uL  Mean Cell Volume : 91.2 fl  Mean Cell Hemoglobin : 30.3 pg  Mean Cell Hemoglobin Concentration : 33.2 gm/dL  Auto Neutrophil # : 13.44 K/uL  Auto Lymphocyte # : 0.80 K/uL  Auto Monocyte # : 0.77 K/uL  Auto Eosinophil # : 0.07 K/uL  Auto Basophil # : 0.03 K/uL  Auto Neutrophil % : 87.4 %  Auto Lymphocyte % : 5.2 %  Auto Monocyte % : 5.0 %  Auto Eosinophil % : 0.5 %  Auto Basophil % : 0.2 %    02 Apr 2024 06:23    136    |  102    |  9      ----------------------------<  148    3.7     |  23     |  0.59     Ca    7.7        02 Apr 2024 06:23  Phos  1.9       02 Apr 2024 06:23  Mg     2.5       02 Apr 2024 06:23    TPro  6.1    /  Alb  2.7    /  TBili  7.0    /  DBili  x      /  AST  179    /  ALT  90     /  AlkPhos  185    02 Apr 2024 06:23    PT/INR - ( 02 Apr 2024 06:23 )   PT: 17.3 sec;   INR: 1.50 ratio         PTT - ( 02 Apr 2024 06:23 )  PTT:34.2 sec  Urinalysis Basic - ( 02 Apr 2024 06:23 )    Color: x / Appearance: x / SG: x / pH: x  Gluc: 148 mg/dL / Ketone: x  / Bili: x / Urobili: x   Blood: x / Protein: x / Nitrite: x   Leuk Esterase: x / RBC: x / WBC x   Sq Epi: x / Non Sq Epi: x / Bacteria: x      CAPILLARY BLOOD GLUCOSE            Culture - Urine (collected 03-30-24 @ 09:52)  Source: Clean Catch Clean Catch (Midstream)  Final Report (03-31-24 @ 12:32):    No growth    Culture - Blood (collected 03-30-24 @ 06:10)  Source: .Blood Blood-Peripheral  Preliminary Report (04-01-24 @ 13:02):    No growth at 48 Hours    Culture - Blood (collected 03-30-24 @ 06:05)  Source: .Blood Blood-Peripheral  Preliminary Report (04-01-24 @ 13:02):    No growth at 48 Hours        RADIOLOGY & ADDITIONAL TESTS:  < from: MR Abdomen w/wo IV Cont (03.31.24 @ 12:30) >  IMPRESSION:  Cirrhosis with sequela of portal hypertension as well as diffuse   heterogeneous hepatic steatosis. Suspected inflammatory changes involving   hepatic segment 5. Correlate with liver enzymes. No suspicious focal   liver lesions. No evidence of iron overload.    Mild duodenal wall thickening with periduodenal infiltration, possibly   representing acute duodenitis or peptic ulcer disease. Correlate with   patient's symptoms.        --- End of Report ---    < end of copied text >    Personally reviewed.     Consultant(s) Notes Reviewed:  [x] YES  [ ] NO     Patient is a 53y old Female who presents with a chief complaint of hematemesis, melena, generalized weakness.      INTERVAL HPI/OVERNIGHT EVENTS:  No acute events overnight. Upon approach this AM, patient is pleasant, sitting up in bed watching TV, and denies any acute concerns. She reports the heaviness in her chest from yesterday has resolved completely. Denies any N/V/D, fever, chills, CP, SOB. She has not yet had a BM.     EGD yesterday demonstrated non-bleeding, small esophageal varices, portal hypertensive gastropathy and duodenitis. MRI abdomen resulted this AM showing cirrhotic liver with portal hypertension, hepatic steatosis and inflammatory changes, w/o suspicious focal liver lesions.     MEDICATIONS  (STANDING):  acetaminophen   IVPB .. 1000 milliGRAM(s) IV Intermittent once  fluticasone propionate 50 MICROgram(s)/spray Nasal Spray 1 Spray(s) Both Nostrils two times a day  folic acid 1 milliGRAM(s) Oral daily  nadolol 20 milliGRAM(s) Oral daily  nicotine -   7 mG/24Hr(s) Patch 1 Patch Transdermal daily  pantoprazole  Injectable 40 milliGRAM(s) IV Push every 12 hours  potassium phosphate IVPB 30 milliMole(s) IV Intermittent once  prednisoLONE    3 mG/mL Solution (ORAPRED) 40 milliGRAM(s) Oral every 24 hours  sucralfate suspension 1 Gram(s) Oral every 6 hours    MEDICATIONS  (PRN):  acetaminophen     Tablet .. 650 milliGRAM(s) Oral every 6 hours PRN Temp greater or equal to 38C (100.4F), Mild Pain (1 - 3)  artificial  tears Solution 1 Drop(s) Both EYES two times a day PRN Dry Eyes  guaiFENesin Oral Liquid (Sugar-Free) 100 milliGRAM(s) Oral every 6 hours PRN Cough  melatonin 3 milliGRAM(s) Oral at bedtime PRN Insomnia  traMADol 25 milliGRAM(s) Oral every 4 hours PRN Moderate Pain (4 - 6)      Allergies    No Known Allergies    Intolerances        REVIEW OF SYSTEMS:  CONSTITUTIONAL: No fever or chills  HEENT: No headache, no sore throat  RESPIRATORY: No cough, wheezing, or shortness of breath  CARDIOVASCULAR: No chest pain, palpitations  GASTROINTESTINAL: No abd pain, nausea, vomiting, or diarrhea  GENITOURINARY: No dysuria, frequency, or hematuria  NEUROLOGICAL: no focal weakness or dizziness  MUSCULOSKELETAL: no myalgias       Vital Signs Last 24 Hrs  T(C): 36.4 (02 Apr 2024 11:15), Max: 37 (01 Apr 2024 16:35)  T(F): 97.5 (02 Apr 2024 11:15), Max: 98.6 (01 Apr 2024 16:35)  HR: 77 (02 Apr 2024 11:15) (73 - 81)  BP: 116/66 (02 Apr 2024 11:15) (83/63 - 116/66)  BP(mean): --  RR: 18 (02 Apr 2024 11:15) (16 - 18)  SpO2: 95% (02 Apr 2024 11:15) (90% - 96%)    Parameters below as of 02 Apr 2024 11:15  Patient On (Oxygen Delivery Method): room air        PHYSICAL EXAM:  GENERAL: NAD at rest  HEENT: +scleral icterus, moist mucous membranes  CHEST/LUNG: CTA b/l, no rales, wheezes, or rhonchi  HEART: RRR, S1, S2  ABDOMEN: BS+, soft, nontender, nondistended  MUSCULOSKELETAL: no edema, cyanosis, or calf tenderness  NEUROLOGIC: answers questions and follows commands appropriately      LABS:                        8.6    15.37 )-----------( 112      ( 02 Apr 2024 06:23 )             25.9     CBC Full  -  ( 02 Apr 2024 06:23 )  WBC Count : 15.37 K/uL  Hemoglobin : 8.6 g/dL  Hematocrit : 25.9 %  Platelet Count - Automated : 112 K/uL  Mean Cell Volume : 91.2 fl  Mean Cell Hemoglobin : 30.3 pg  Mean Cell Hemoglobin Concentration : 33.2 gm/dL  Auto Neutrophil # : 13.44 K/uL  Auto Lymphocyte # : 0.80 K/uL  Auto Monocyte # : 0.77 K/uL  Auto Eosinophil # : 0.07 K/uL  Auto Basophil # : 0.03 K/uL  Auto Neutrophil % : 87.4 %  Auto Lymphocyte % : 5.2 %  Auto Monocyte % : 5.0 %  Auto Eosinophil % : 0.5 %  Auto Basophil % : 0.2 %    02 Apr 2024 06:23    136    |  102    |  9      ----------------------------<  148    3.7     |  23     |  0.59     Ca    7.7        02 Apr 2024 06:23  Phos  1.9       02 Apr 2024 06:23  Mg     2.5       02 Apr 2024 06:23    TPro  6.1    /  Alb  2.7    /  TBili  7.0    /  DBili  x      /  AST  179    /  ALT  90     /  AlkPhos  185    02 Apr 2024 06:23    PT/INR - ( 02 Apr 2024 06:23 )   PT: 17.3 sec;   INR: 1.50 ratio         PTT - ( 02 Apr 2024 06:23 )  PTT:34.2 sec  Urinalysis Basic - ( 02 Apr 2024 06:23 )    Color: x / Appearance: x / SG: x / pH: x  Gluc: 148 mg/dL / Ketone: x  / Bili: x / Urobili: x   Blood: x / Protein: x / Nitrite: x   Leuk Esterase: x / RBC: x / WBC x   Sq Epi: x / Non Sq Epi: x / Bacteria: x      CAPILLARY BLOOD GLUCOSE            Culture - Urine (collected 03-30-24 @ 09:52)  Source: Clean Catch Clean Catch (Midstream)  Final Report (03-31-24 @ 12:32):    No growth    Culture - Blood (collected 03-30-24 @ 06:10)  Source: .Blood Blood-Peripheral  Preliminary Report (04-01-24 @ 13:02):    No growth at 48 Hours    Culture - Blood (collected 03-30-24 @ 06:05)  Source: .Blood Blood-Peripheral  Preliminary Report (04-01-24 @ 13:02):    No growth at 48 Hours        RADIOLOGY & ADDITIONAL TESTS:  < from: MR Abdomen w/wo IV Cont (03.31.24 @ 12:30) >  IMPRESSION:  Cirrhosis with sequela of portal hypertension as well as diffuse   heterogeneous hepatic steatosis. Suspected inflammatory changes involving   hepatic segment 5. Correlate with liver enzymes. No suspicious focal   liver lesions. No evidence of iron overload.    Mild duodenal wall thickening with periduodenal infiltration, possibly   representing acute duodenitis or peptic ulcer disease. Correlate with   patient's symptoms.        --- End of Report ---    < end of copied text >    Personally reviewed.     Consultant(s) Notes Reviewed:  [x] YES  [ ] NO

## 2024-04-03 VITALS
RESPIRATION RATE: 18 BRPM | SYSTOLIC BLOOD PRESSURE: 94 MMHG | DIASTOLIC BLOOD PRESSURE: 56 MMHG | TEMPERATURE: 98 F | HEART RATE: 60 BPM | OXYGEN SATURATION: 95 %

## 2024-04-03 LAB
ALBUMIN SERPL ELPH-MCNC: 2.8 G/DL — LOW (ref 3.3–5)
ALP SERPL-CCNC: 197 U/L — HIGH (ref 40–120)
ALT FLD-CCNC: 87 U/L — HIGH (ref 12–78)
ANION GAP SERPL CALC-SCNC: 8 MMOL/L — SIGNIFICANT CHANGE UP (ref 5–17)
APTT BLD: 33.1 SEC — SIGNIFICANT CHANGE UP (ref 24.5–35.6)
AST SERPL-CCNC: 128 U/L — HIGH (ref 15–37)
BASOPHILS # BLD AUTO: 0.04 K/UL — SIGNIFICANT CHANGE UP (ref 0–0.2)
BASOPHILS NFR BLD AUTO: 0.2 % — SIGNIFICANT CHANGE UP (ref 0–2)
BILIRUB SERPL-MCNC: 5.4 MG/DL — HIGH (ref 0.2–1.2)
BUN SERPL-MCNC: 11 MG/DL — SIGNIFICANT CHANGE UP (ref 7–23)
CALCIUM SERPL-MCNC: 8.4 MG/DL — LOW (ref 8.5–10.1)
CHLORIDE SERPL-SCNC: 103 MMOL/L — SIGNIFICANT CHANGE UP (ref 96–108)
CO2 SERPL-SCNC: 25 MMOL/L — SIGNIFICANT CHANGE UP (ref 22–31)
CREAT SERPL-MCNC: 0.66 MG/DL — SIGNIFICANT CHANGE UP (ref 0.5–1.3)
EGFR: 106 ML/MIN/1.73M2 — SIGNIFICANT CHANGE UP
EOSINOPHIL # BLD AUTO: 0.06 K/UL — SIGNIFICANT CHANGE UP (ref 0–0.5)
EOSINOPHIL NFR BLD AUTO: 0.3 % — SIGNIFICANT CHANGE UP (ref 0–6)
GLUCOSE SERPL-MCNC: 109 MG/DL — HIGH (ref 70–99)
HCT VFR BLD CALC: 28.3 % — LOW (ref 34.5–45)
HGB BLD-MCNC: 9.3 G/DL — LOW (ref 11.5–15.5)
IMM GRANULOCYTES NFR BLD AUTO: 3.9 % — HIGH (ref 0–0.9)
INR BLD: 1.46 RATIO — HIGH (ref 0.85–1.18)
LYMPHOCYTES # BLD AUTO: 1.18 K/UL — SIGNIFICANT CHANGE UP (ref 1–3.3)
LYMPHOCYTES # BLD AUTO: 5.8 % — LOW (ref 13–44)
MAGNESIUM SERPL-MCNC: 2.3 MG/DL — SIGNIFICANT CHANGE UP (ref 1.6–2.6)
MCHC RBC-ENTMCNC: 30.5 PG — SIGNIFICANT CHANGE UP (ref 27–34)
MCHC RBC-ENTMCNC: 32.9 GM/DL — SIGNIFICANT CHANGE UP (ref 32–36)
MCV RBC AUTO: 92.8 FL — SIGNIFICANT CHANGE UP (ref 80–100)
MONOCYTES # BLD AUTO: 1.29 K/UL — HIGH (ref 0–0.9)
MONOCYTES NFR BLD AUTO: 6.3 % — SIGNIFICANT CHANGE UP (ref 2–14)
NEUTROPHILS # BLD AUTO: 17.15 K/UL — HIGH (ref 1.8–7.4)
NEUTROPHILS NFR BLD AUTO: 83.5 % — HIGH (ref 43–77)
NRBC # BLD: 0 /100 WBCS — SIGNIFICANT CHANGE UP (ref 0–0)
PHOSPHATE SERPL-MCNC: 2.6 MG/DL — SIGNIFICANT CHANGE UP (ref 2.5–4.5)
PLATELET # BLD AUTO: 170 K/UL — SIGNIFICANT CHANGE UP (ref 150–400)
POTASSIUM SERPL-MCNC: 4.2 MMOL/L — SIGNIFICANT CHANGE UP (ref 3.5–5.3)
POTASSIUM SERPL-SCNC: 4.2 MMOL/L — SIGNIFICANT CHANGE UP (ref 3.5–5.3)
PROT SERPL-MCNC: 6.8 G/DL — SIGNIFICANT CHANGE UP (ref 6–8.3)
PROTHROM AB SERPL-ACNC: 16.9 SEC — HIGH (ref 9.5–13)
RBC # BLD: 3.05 M/UL — LOW (ref 3.8–5.2)
RBC # FLD: 21 % — HIGH (ref 10.3–14.5)
SODIUM SERPL-SCNC: 136 MMOL/L — SIGNIFICANT CHANGE UP (ref 135–145)
WBC # BLD: 20.52 K/UL — HIGH (ref 3.8–10.5)
WBC # FLD AUTO: 20.52 K/UL — HIGH (ref 3.8–10.5)

## 2024-04-03 PROCEDURE — 83735 ASSAY OF MAGNESIUM: CPT

## 2024-04-03 PROCEDURE — 93306 TTE W/DOPPLER COMPLETE: CPT

## 2024-04-03 PROCEDURE — 80053 COMPREHEN METABOLIC PANEL: CPT

## 2024-04-03 PROCEDURE — 82747 ASSAY OF FOLIC ACID RBC: CPT

## 2024-04-03 PROCEDURE — 80074 ACUTE HEPATITIS PANEL: CPT

## 2024-04-03 PROCEDURE — 88312 SPECIAL STAINS GROUP 1: CPT

## 2024-04-03 PROCEDURE — 83690 ASSAY OF LIPASE: CPT

## 2024-04-03 PROCEDURE — 36430 TRANSFUSION BLD/BLD COMPNT: CPT

## 2024-04-03 PROCEDURE — 82272 OCCULT BLD FECES 1-3 TESTS: CPT

## 2024-04-03 PROCEDURE — 86304 IMMUNOASSAY TUMOR CA 125: CPT

## 2024-04-03 PROCEDURE — 94640 AIRWAY INHALATION TREATMENT: CPT

## 2024-04-03 PROCEDURE — 84466 ASSAY OF TRANSFERRIN: CPT

## 2024-04-03 PROCEDURE — 85610 PROTHROMBIN TIME: CPT

## 2024-04-03 PROCEDURE — 74177 CT ABD & PELVIS W/CONTRAST: CPT | Mod: MC

## 2024-04-03 PROCEDURE — 85045 AUTOMATED RETICULOCYTE COUNT: CPT

## 2024-04-03 PROCEDURE — 87040 BLOOD CULTURE FOR BACTERIA: CPT

## 2024-04-03 PROCEDURE — 82105 ALPHA-FETOPROTEIN SERUM: CPT

## 2024-04-03 PROCEDURE — 82378 CARCINOEMBRYONIC ANTIGEN: CPT

## 2024-04-03 PROCEDURE — 85025 COMPLETE CBC W/AUTO DIFF WBC: CPT

## 2024-04-03 PROCEDURE — 83540 ASSAY OF IRON: CPT

## 2024-04-03 PROCEDURE — 83550 IRON BINDING TEST: CPT

## 2024-04-03 PROCEDURE — 85014 HEMATOCRIT: CPT

## 2024-04-03 PROCEDURE — 70450 CT HEAD/BRAIN W/O DYE: CPT | Mod: MC

## 2024-04-03 PROCEDURE — 83036 HEMOGLOBIN GLYCOSYLATED A1C: CPT

## 2024-04-03 PROCEDURE — 85652 RBC SED RATE AUTOMATED: CPT

## 2024-04-03 PROCEDURE — 82550 ASSAY OF CK (CPK): CPT

## 2024-04-03 PROCEDURE — 93005 ELECTROCARDIOGRAM TRACING: CPT

## 2024-04-03 PROCEDURE — 84484 ASSAY OF TROPONIN QUANT: CPT

## 2024-04-03 PROCEDURE — P9016: CPT

## 2024-04-03 PROCEDURE — 84702 CHORIONIC GONADOTROPIN TEST: CPT

## 2024-04-03 PROCEDURE — 99239 HOSP IP/OBS DSCHRG MGMT >30: CPT | Mod: GC

## 2024-04-03 PROCEDURE — 36415 COLL VENOUS BLD VENIPUNCTURE: CPT

## 2024-04-03 PROCEDURE — 96374 THER/PROPH/DIAG INJ IV PUSH: CPT

## 2024-04-03 PROCEDURE — 82746 ASSAY OF FOLIC ACID SERUM: CPT

## 2024-04-03 PROCEDURE — A9579: CPT

## 2024-04-03 PROCEDURE — 82728 ASSAY OF FERRITIN: CPT

## 2024-04-03 PROCEDURE — 82962 GLUCOSE BLOOD TEST: CPT

## 2024-04-03 PROCEDURE — 83605 ASSAY OF LACTIC ACID: CPT

## 2024-04-03 PROCEDURE — 82553 CREATINE MB FRACTION: CPT

## 2024-04-03 PROCEDURE — 96375 TX/PRO/DX INJ NEW DRUG ADDON: CPT

## 2024-04-03 PROCEDURE — 88305 TISSUE EXAM BY PATHOLOGIST: CPT

## 2024-04-03 PROCEDURE — 86901 BLOOD TYPING SEROLOGIC RH(D): CPT

## 2024-04-03 PROCEDURE — 99285 EMERGENCY DEPT VISIT HI MDM: CPT

## 2024-04-03 PROCEDURE — 86850 RBC ANTIBODY SCREEN: CPT

## 2024-04-03 PROCEDURE — 86301 IMMUNOASSAY TUMOR CA 19-9: CPT

## 2024-04-03 PROCEDURE — 85018 HEMOGLOBIN: CPT

## 2024-04-03 PROCEDURE — 80061 LIPID PANEL: CPT

## 2024-04-03 PROCEDURE — 85730 THROMBOPLASTIN TIME PARTIAL: CPT

## 2024-04-03 PROCEDURE — 86923 COMPATIBILITY TEST ELECTRIC: CPT

## 2024-04-03 PROCEDURE — 81003 URINALYSIS AUTO W/O SCOPE: CPT

## 2024-04-03 PROCEDURE — 87086 URINE CULTURE/COLONY COUNT: CPT

## 2024-04-03 PROCEDURE — P9017: CPT

## 2024-04-03 PROCEDURE — 74183 MRI ABD W/O CNTR FLWD CNTR: CPT | Mod: MC

## 2024-04-03 PROCEDURE — 86140 C-REACTIVE PROTEIN: CPT

## 2024-04-03 PROCEDURE — 82607 VITAMIN B-12: CPT

## 2024-04-03 PROCEDURE — 81256 HFE GENE: CPT

## 2024-04-03 PROCEDURE — 86900 BLOOD TYPING SEROLOGIC ABO: CPT

## 2024-04-03 PROCEDURE — 84100 ASSAY OF PHOSPHORUS: CPT

## 2024-04-03 PROCEDURE — 85027 COMPLETE CBC AUTOMATED: CPT

## 2024-04-03 RX ORDER — PANTOPRAZOLE SODIUM 20 MG/1
40 TABLET, DELAYED RELEASE ORAL
Refills: 0 | Status: DISCONTINUED | OUTPATIENT
Start: 2024-04-03 | End: 2024-04-03

## 2024-04-03 RX ORDER — NADOLOL 80 MG/1
1 TABLET ORAL
Qty: 30 | Refills: 0
Start: 2024-04-03 | End: 2024-05-02

## 2024-04-03 RX ORDER — PANTOPRAZOLE SODIUM 20 MG/1
1 TABLET, DELAYED RELEASE ORAL
Qty: 30 | Refills: 0
Start: 2024-04-03 | End: 2024-05-02

## 2024-04-03 RX ORDER — PREDNISOLONE 5 MG
13.33 TABLET ORAL
Qty: 373.24 | Refills: 0
Start: 2024-04-03 | End: 2024-04-30

## 2024-04-03 RX ORDER — FOLIC ACID 0.8 MG
1 TABLET ORAL
Qty: 30 | Refills: 0
Start: 2024-04-03 | End: 2024-05-02

## 2024-04-03 RX ORDER — NICOTINE POLACRILEX 2 MG
1 GUM BUCCAL
Qty: 2 | Refills: 0
Start: 2024-04-03 | End: 2024-05-02

## 2024-04-03 RX ADMIN — Medication 1 PATCH: at 12:22

## 2024-04-03 RX ADMIN — Medication 1 MILLIGRAM(S): at 12:19

## 2024-04-03 RX ADMIN — NADOLOL 20 MILLIGRAM(S): 80 TABLET ORAL at 05:35

## 2024-04-03 RX ADMIN — Medication 1 SPRAY(S): at 05:35

## 2024-04-03 RX ADMIN — Medication 1 GRAM(S): at 05:35

## 2024-04-03 RX ADMIN — PANTOPRAZOLE SODIUM 40 MILLIGRAM(S): 20 TABLET, DELAYED RELEASE ORAL at 05:36

## 2024-04-03 RX ADMIN — Medication 1 GRAM(S): at 12:19

## 2024-04-03 NOTE — CASE MANAGEMENT PROGRESS NOTE - NSCMPROGRESSNOTE_GEN_ALL_CORE
Per MD pt is stable for transition home today. Pt is self pay, Financial department notified. Representative stated that she has been in contact with patient to apply for Medicaid. Pt is in agreement with transition home today, family member or friend will be transporting her home.

## 2024-04-03 NOTE — PROGRESS NOTE ADULT - ASSESSMENT
anemia  cirrhosis  'gi bleed    s/p egd 4/1; varices and duodenitis  MRI noted d/w pt  Steroids x28 days total  Nadolol  PPI  Completed octreotide  EtOH cessation  Needs hepatology follow up, [facility] hepatology clinic information given to pt  D/w pt  D/c planning    I reviewed the overnight course of events on the unit, re-confirming the patient history. I discussed the care with the patient  Differential diagnosis and plan of care discussed with patient after the evaluation  35 minutes spent on total encounter of which more than fifty percent of the encounter was spent counseling and/or coordinating care by the attending physician.

## 2024-04-03 NOTE — DISCHARGE NOTE NURSING/CASE MANAGEMENT/SOCIAL WORK - NSDCPEWEB_GEN_ALL_CORE
[facility] for Tobacco Control website --- [email or web address] website --- [email or web address]

## 2024-04-03 NOTE — DISCHARGE NOTE NURSING/CASE MANAGEMENT/SOCIAL WORK - PATIENT PORTAL LINK FT
You can access the FollowMyHealth Patient Portal offered by [facility] by registering at the following website: [email or web address]. By joining [facility]’s FollowMyHealth portal, you will also be able to view your health information using other applications (apps) compatible with our system.

## 2024-04-03 NOTE — DISCHARGE NOTE NURSING/CASE MANAGEMENT/SOCIAL WORK - NSDCPEEMAIL_GEN_ALL_CORE
[facility] for Tobacco Control email [email or web address]

## 2024-04-03 NOTE — PROGRESS NOTE ADULT - SUBJECTIVE AND OBJECTIVE BOX
[facility]  Dequan Guerra PA-C  [address] [place] [address]  [phone]      INTERVAL HPI/OVERNIGHT EVENTS:  Pt s/e  Pt feels well without GI complaints    MEDICATIONS  (STANDING):  fluticasone propionate 50 MICROgram(s)/spray Nasal Spray 1 Spray(s) Both Nostrils two times a day  folic acid 1 milliGRAM(s) Oral daily  nadolol 20 milliGRAM(s) Oral daily  nicotine -   7 mG/24Hr(s) Patch 1 Patch Transdermal daily  pantoprazole    Tablet 40 milliGRAM(s) Oral before breakfast  prednisoLONE    3 mG/mL Solution (ORAPRED) 40 milliGRAM(s) Oral every 24 hours  sucralfate suspension 1 Gram(s) Oral every 6 hours    MEDICATIONS  (PRN):  acetaminophen     Tablet .. 650 milliGRAM(s) Oral every 6 hours PRN Temp greater or equal to 38C (100.4F), Mild Pain (1 - 3)  artificial  tears Solution 1 Drop(s) Both EYES two times a day PRN Dry Eyes  guaiFENesin Oral Liquid (Sugar-Free) 100 milliGRAM(s) Oral every 6 hours PRN Cough  melatonin 3 milliGRAM(s) Oral at bedtime PRN Insomnia  traMADol 25 milliGRAM(s) Oral every 4 hours PRN Moderate Pain (4 - 6)      Allergies    No Known Allergies    PHYSICAL EXAM:   Vital Signs:  Vital Signs Last 24 Hrs  T(C): 36.7 (2024 11:25), Max: 36.8 (2024 20:36)  T(F): 98 (2024 11:25), Max: 98.2 (2024 20:36)  HR: 60 (2024 11:25) (60 - 85)  BP: 94/56 (2024 11:25) (94/56 - 135/87)  BP(mean): --  RR: 18 (2024 11:25) (17 - 18)  SpO2: 95% (2024 11:25) (95% - 96%)    Parameters below as of 2024 11:25  Patient On (Oxygen Delivery Method): room air      Daily     Daily Weight in k.2 (2024 04:15)    GENERAL:  Appears stated age  HEENT:  NC/AT  CHEST:  Full & symmetric excursion  HEART:  Regular rhythm  ABDOMEN:  Soft, non-tender, non-distended  EXTEREMITIES:  no cyanosis  SKIN:  No rash  NEURO:  Alert      LABS:                        9.3    20.52 )-----------( 170      ( 2024 07:40 )             28.3     04-03    136  |  103  |  11  ----------------------------<  109<H>  4.2   |  25  |  0.66    Ca    8.4<L>      2024 07:40  Phos  2.6     04-03  Mg     2.3     04-03    TPro  6.8  /  Alb  2.8<L>  /  TBili  5.4<H>  /  DBili  x   /  AST  128<H>  /  ALT  87<H>  /  AlkPhos  197<H>  04-03    PT/INR - ( 2024 07:40 )   PT: 16.9 sec;   INR: 1.46 ratio         PTT - ( 2024 07:40 )  PTT:33.1 sec  Urinalysis Basic - ( 2024 07:40 )    Color: x / Appearance: x / SG: x / pH: x  Gluc: 109 mg/dL / Ketone: x  / Bili: x / Urobili: x   Blood: x / Protein: x / Nitrite: x   Leuk Esterase: x / RBC: x / WBC x   Sq Epi: x / Non Sq Epi: x / Bacteria: x        RADIOLOGY & ADDITIONAL TESTS:  < from: MR Abdomen w/wo IV Cont (24 @ 12:30) >    ACC: [id number] EXAM:  MR ABDOMEN WAW IC   ORDERED BY: PERRI PARIS     PROCEDURE DATE:  2024          INTERPRETATION:  CLINICAL INFORMATION: Abnormal CT. Evaluate liver.    COMPARISON: CT 3/30/2024    CONTRAST/COMPLICATIONS:  IV Contrast: Gadavist  7 cc administered   0.5 cc discarded  Oral Contrast: NONE  Complications: None reported at time of study completion    PROCEDURE:  MRI of the abdomen was performed.  MRCP was performed.    FINDINGS:  LOWER CHEST: Trace pleural effusions.    LIVER: Cirrhosis with hepatomegaly. Diffuse heterogeneous hepatic   steatosis. Heterogeneous arterial phase enhancement throughout the liver   is likely perfusional in etiology. Multifocal hyperenhancement in the   right hepatic lobe predominantly involving segment 5 with associated mild   diffusion restriction, possibly inflammatory in etiology. No suspicious   focal liver lesions. No findings to suggest iron overload.  BILE DUCTS: Normal caliber.  GALLBLADDER: Gallbladder wall edema, likely related to portal hypertension  SPLEEN: Splenomegaly.  PANCREAS: Normal signal and enhancement. No ductal dilatation.  ADRENALS: Within normal limits.  KIDNEYS/URETERS: Within normal limits.    VISUALIZED PORTIONS:  BOWEL: Mild duodenal wall thickening and periduodenal stranding, possibly   representing acute duodenitis or peptic ulcer disease.  PERITONEUM: Trace ascites.  VESSELS: Patent portal and hepatic veins. Patent mesenteric vasculature.   Normal caliber abdominal aorta.  RETROPERITONEUM/LYMPH NODES: No lymphadenopathy.  ABDOMINAL WALL: Within normal limits.  BONES: Within normal limits.    IMPRESSION:  Cirrhosis with sequela of portal hypertension as well as diffuse   heterogeneous hepatic steatosis. Suspected inflammatory changes involving   hepatic segment 5. Correlate with liver enzymes. No suspicious focal   liver lesions. No evidence of iron overload.    Mild duodenal wall thickening with periduodenal infiltration, possibly   representing acute duodenitis or peptic ulcer disease. Correlate with   patient's symptoms.    --- End of Report ---      ADRY GARCIA DO; Attending Radiologist  This document has been electronically signed. 2024 11:10AM    < end of copied text >

## 2024-04-03 NOTE — PROGRESS NOTE ADULT - PROVIDER SPECIALTY LIST ADULT
Gastroenterology
Heme/Onc
Infectious Disease
Heme/Onc
Infectious Disease
Gastroenterology
Gastroenterology
Heme/Onc
Hospitalist

## 2024-04-03 NOTE — DISCHARGE NOTE NURSING/CASE MANAGEMENT/SOCIAL WORK - NSDCPEFALRISK_GEN_ALL_CORE
For information on Fall & Injury Prevention, visit: [email or web address] OR  [email or web address] OR  https://www.cdc.gov/steadi/patient.html

## 2024-04-04 LAB
CULTURE RESULTS: SIGNIFICANT CHANGE UP
CULTURE RESULTS: SIGNIFICANT CHANGE UP
SPECIMEN SOURCE: SIGNIFICANT CHANGE UP
SPECIMEN SOURCE: SIGNIFICANT CHANGE UP

## 2024-06-23 NOTE — CARE COORDINATION ASSESSMENT. - OTHER PERTINENT DISCHARGE PLANNING INFORMATION:
Problem: Infection  Goal: Absence of Infection Signs and Symptoms  Outcome: Progressing     Problem: Adult Inpatient Plan of Care  Goal: Plan of Care Review  Outcome: Progressing  Goal: Patient-Specific Goal (Individualized)  Outcome: Progressing  Goal: Absence of Hospital-Acquired Illness or Injury  Outcome: Progressing  Goal: Optimal Comfort and Wellbeing  Outcome: Progressing  Goal: Readiness for Transition of Care  Outcome: Progressing     Problem: Bariatric Environmental Safety  Goal: Safety Maintained with Care  Outcome: Progressing     Problem: Skin Injury Risk Increased  Goal: Skin Health and Integrity  Outcome: Progressing     Problem: Diabetes Comorbidity  Goal: Blood Glucose Level Within Targeted Range  Outcome: Progressing     Problem: Sepsis/Septic Shock  Goal: Optimal Coping  Outcome: Progressing  Goal: Absence of Bleeding  Outcome: Progressing  Goal: Blood Glucose Level Within Targeted Range  Outcome: Progressing  Goal: Absence of Infection Signs and Symptoms  Outcome: Progressing  Goal: Optimal Nutrition Intake  Outcome: Progressing     Problem: Wound  Goal: Optimal Coping  Outcome: Progressing  Goal: Optimal Functional Ability  Outcome: Progressing  Goal: Absence of Infection Signs and Symptoms  Outcome: Progressing  Goal: Improved Oral Intake  Outcome: Progressing  Goal: Optimal Pain Control and Function  Outcome: Progressing  Goal: Skin Health and Integrity  Outcome: Progressing  Goal: Optimal Wound Healing  Outcome: Progressing     Problem: Fall Injury Risk  Goal: Absence of Fall and Fall-Related Injury  Outcome: Progressing     Problem: Pain Acute  Goal: Optimal Pain Control and Function  Outcome: Progressing     Problem: Hemodialysis  Goal: Safe, Effective Therapy Delivery  Outcome: Progressing  Goal: Effective Tissue Perfusion  Outcome: Progressing  Goal: Absence of Infection Signs and Symptoms  Outcome: Progressing      Met patient at bedside.  Explained role of CM, verbalized understanding. Pt was made aware a CM will remain available through hospitalization.  Contact information given in discharge/ transitions resource folder.CM met w pt. Per pt  lives with 15 yo daughter and was independent w ADL, ambulation, driving and med management prior to admission. Pt drives to appointments, has 7 stairs to enter home, 7 steps inside. denies community services, denies DME or need for usage. Pt has not identified a PCP in the community. PCP list has been provided and CM discussed importance of establishing care with PCP. Pt verbalized understanding of importance of following up with a PCP. CM to remain available throughout hospitalization. Pharmacy: [facility]. : stated no. Pt stated her mother Shira will  when medically safe for discharge.

## 2025-06-21 NOTE — CARE COORDINATION ASSESSMENT. - NSTRANSPORTNEEDS_GEN_ALL_CORE
[facility]PITAL[facility] DISCHARGE SUMMARY NOTE    ADMISSION DATE:  6/17/2025  DISCHARGE DATE:  6/20/25  DISCHARGING PHYSICIAN:  Margret Cotto MD  ATTENDING PHYSICIAN:  Margret Cotto MD      DISCHARGE DISPOSITION:    Transferred to [facility]     CONDITION AT DISCHARGE:    serious        HOSPITAL COURSE  Patient is a 45-year-old female with a past medical history significant for breast cancer status post mastectomy and radiation therapy in the past and alcoholic liver disease with liver cirrhosis recently requiring paracentesis who was brought to the ED by the EMS with complaint of worsening abdominal distention following a recent paracentesis 1 week ago.  Patient reports developing worsening abdominal distention over the past 1 week with symptoms rated as moderate to severe.  Associated symptoms include abdominal discomfort.  Patient denies nausea, vomiting, fever, chills, diarrhea, headaches, blurry vision, dizziness or lightheadedness.  Patient recently underwent ultrasound-guided paracentesis 1 week ago at [facility] with 7.6 L of IV fluid removed.     Patient was seen in the ED afebrile and hemodynamically stable.  Initial workup was significant for magnesium 1.4, blood glucose 119, total bilirubin 8.5, , ALT 28, alk phosphatase 215, albumin 1.6, ammonia level 66, lactic acid 3.9, hemoglobin 8.3 with hematocrit 25.1, platelet 56.  Blood alcohol level elevated at 221.  Patient was admitted for further management.      Treated for decompensated liver failure due to suspected alcoholic hepatitis.  S/p paracentesis x2 for 2L and 1L.  Started on NAC and steroid protocol.  Infection ruled out.  Given 1u plt and rbc transfusion.          DISCHARGE DIAGNOSES:      Acute decompensated alcoholic liver cirrhosis with chronic recurrent ascites and hyperammonemia  Severe Acute alcoholic hepatitis  Patient's clinical presentation including worsening abdominal distention is secondary to decompensated alcoholic liver  cirrhosis with abdominal ascites. MELD Na Score 24. Patient recently underwent imaging guided paracentesis at [facility] on 6/6/2025 with 7.6 L of ascitic fluid removed.  She is currently status post bedside paracentesis in the ED with 2 L of fluid removed followed by IV albumin.  Patient remains symptomatic with persistent abdominal distention.  Currently hemodynamically stable.  Will resume home diuretic therapy with IV Lasix. IR consulted for further imaging guided therapeutic paracentesis.  Will continue lactulose for hyperammonemia and titrate to maintain 2-3 bowel movement daily.  GI on consult and notified through [employer] in the ED.  Follow-up additional recommendations after evaluation.  Will monitor clinically in ICU.  MELD 3.0: 32 at 6/19/2025  4:51 AM  MELD-Na: 28 at 6/19/2025  4:51 AM  Calculated from:  Serum Creatinine: 0.5 mg/dL (Using min of 1 mg/dL) at 6/19/2025  4:51 AM  Serum Sodium: 136 mmol/L at 6/19/2025  4:51 AM  Total Bilirubin: 15.3 mg/dL at 6/19/2025  4:51 AM  Serum Albumin: 2.1 g/dL at 6/19/2025  4:51 AM  INR(ratio): 2.8 at 6/19/2025  4:51 AM  Age at listing (hypothetical): 45 years  Sex: Female at 6/19/2025  4:51 AM  Maddrey Score: 89.8 at 6/20/2025  7:26 AM  6/18 start N-acetylcysteine protocol for day 1 today.  Start prednisolone 40 days (low suspicion for infection, follow up ucx, patient asymptomaic).  Cont lactulose and iv ppi.  On iv lasix and aldactone.  Still needs repeat LVP but severe tcp today,  consult IR   6/19 worsening liver failure.  Tylenol level negative.  Cont NAC daily for 4 more days.  Cont steroids.  Calculate lille score on 6/24. Ucx and peritoneal culture no growth to date.  Needs repeat paracentesis today, IR to perform, requested 1 unit of platelets to be running as level <50k. Suspect this will be LVP, plan for iv albumin after.  Cont diuretics.  Needs transfer to [facility] for hepatology evaluation. Attempt for [facility] started yesterday, follow up today.    6/20 s/p paracentesis yesterday for only 1L.bp soft today, hold lasix and aldactone   discussed with Tato AGUILAR yesterday, patient accepted for transfer.  Tbili  down to 12.8 today.  Inr pending.  Cont nac and steroids.       Hypomagnesemia, hypophosphatemia, hypokalemia  Magnesium 1.4 on admission.  IV magnesium 2 g was given.  Follow-up repeat magnesium level.  Continue to monitor electrolytes and replace as needed.  6/19 replete and replace      SIRS with lactic acidosis  Patient seen in the ED tachycardic and tachypneic with lactic acidosis.  Doubt severe sepsis given no infectious source identified at this time.  SIRS is likely reactive secondary to decompensated liver disease.  Will continue to monitor clinically.     Chronic bicytopenia  Acute pancytopenia due to liver failure, Critical thrombocytopenia  Acute on chronic DIC due to liver failure  Hemoglobin 8.3 on admission with platelet 56K.  Known history of chronic bicytopenia due to liver disease.  CBC is currently at baseline as compared to recent level per outpatient records.  Continue to monitor CBC.  6/18 plt today 8k-- repeat 38k. Transfuse if needed for paracentesis.  Fibrinogen 70.  Consult Heme   6/19 plt 34k, 1 unit ordered.  Consider repeating fibrinogen. S/p vit K po yesterday.  Inr 2.8 today, rising. Discussed with heme    6/20 hb 6.2, transfuse 1u packed red blood cells.  Plt 39k.       Alcohol dependence   Blood alcohol level elevated on admission at 221.  Patient reports a recent relapse after being sober for more than 3 months.  Immediate cessation strongly advised and encouraged.  Patient was educated regarding her increasing risk of worsening alcoholic liver disease.  She verbalized understanding and compliance with instructions.  Will monitor clinically for alcohol withdrawal and manage with CIWA protocol.  6/18 patient reports she only drank \"1 cooler\" due to stress from father's day.  This does not match BAL (221) on arrival.  Patient  minimizing drinking.  Seen by CD and declined services. Also reports that her baby's father just  from liver failure.  She states she was sober prior to that since March.  Explained importance of cessation and need to follow up with hepatologist for transplant evaluation.  Spent 16 minutes counseling on alcohol cessation. Cont vitamins   Audit C         Audit C Total Score     2             Sinus tachycardia   cont tele.  EKG with ST, no ischemia.  Due to above.          Urine tox + fentanyl   given fentanyl in ER          Nutrition status: Does Not Meet Criteria for Malnutrition;  hypoalbuminemia  Body mass index is 28.16 kg/m².      Albumin (g/dL)   Date Value   2025 2.1 (L)              Plan of care and recommendations discussed with patient/Surrogate decision making. They expressed full understanding. All questions answered.   Discussed with all consultants, who are all in agreement that patient is cleared and stable for discharge.         Vitals:    25   BP: 112/75   Pulse: (!) 112   Resp: 18   Temp: 98.1 °F (36.7 °C)     Physical Exam       SIGNIFICANT FINDINGS/COMPLICATIONS:    Pathology/CX results:  * Cannot find OR log *    Microbiology Results  (Last 10 results in the past 7 days)      Specimen   Gram Smear   Culture Result   Status       25  1437         No organisms seen.  [P]            Present Polymorphonuclear cells.  [P]            Slide prepared by [employer].  [P]           25  1813         No organisms seen.            Present Polymorphonuclear cells.            Slide prepared by [employer].                   [P] - Preliminary Result                   IR PARACENTESIS  Result Date: 2025  Narrative: PROCEDURE: ULTRASOUND GUIDED PARACENTESIS. INTERVENTIONALIST: Charles Rowland M.D. ASSISTANT(S): Mark Silvestre PA-C CLINICAL HISTORY: Alcohol-related cirrhosis and ascites PREPROCEDURE DIAGNOSIS: Symptomatic abdominal distention/ascites INDICATION: 45  years old Female with recurrent ascites who requires paracentesis for diagnostic and therapeutic purposes. POST PROCEDURE DIAGNOSIS: Same     Impression: Technically successful ultrasound-guided paracentesis. PLANS: Body fluid sent for analysis follow-up on labs repeat paracentesis as needed ___________________________________________________________________________ ______________________________________ PROCEDURES PERFORMED: Ultrasound Guided Diagnostic and Therapeutic Paracentesis ANESTHESIA/SEDATION: Conscious sedation was not used for the procedure. PROPHYLACTIC ANTIBIOTIC: None. PREPARATION: The site was prepared and draped and all elements of maximal sterile barrier technique including sterile gloves, sterile gown, mask, large sterile sheet, hand hygiene and cutaneous antisepsis with 2% chlorhexidine +70% isopropyl alcohol were used. Discussion of Risks, Benefits and Alternatives completed with patient/authorized decision maker. Additionally, potential problems related to recuperation, likelihood of achieving care, treatment and service goals were discussed. Relevant risks, benefits and side effects related to alternatives, including the possible results of not receiving care, treatment and services discussed. When indicated, any limitations on the confidentiality of information learned from or about the patient were explained. All patient/authorized decision maker questions answered and consent for procedure was provided. The patient's identification, correct procedure and position were verified. The appropriate site was verified and marked as appropriate. Equipment/Implants were checked for functionality and availability. PROCEDURE/FINDINGS: ULTRASOUND GUIDED PARACENTESIS: A catheter/needle was introduced into the largest pocket of fluid in the abdominal cavity under real ultrasound guidance.  The image demonstrates the tip of the needle within the target area.  An image was sent to the PACS for  documentation purposes.  Using this access, a paracentesis was performed. The catheter was then removed, and manual pressure was applied to the puncture site, followed by application of a sterile dressing.      Location: Right lower quadrant      Needle: Diego      Description of the fluid: Clear yellow      Albumin: 0 grams intravenously      Amount drained: 1000 cc      SPECIMENS: Samples were sent for laboratory analysis COMPLICATIONS: None ESTIMATED BLOOD LOSS: Minimal RADIATION/CONTRAST DOSE: None. Dictated by: Mark Silvestre PA-C Dictated on: 2025 4:22 PM FLOR REID M.D., have reviewed the images and report and concur with these findings interpreted by Mark Silvestre PA-C. Electronically Signed by: FLOR ACEVEDO M.D. Signed on: 2025 12:45 PM Workstation ID: [id number]    TRANSTHORACIC ECHO (TTE) COMPLETE W/ W/O IMAGING AGENT  Impression: *[facility]* [address] [place] [id number] [phone] Transthoracic Echocardiogram (TTE) Patient: Dona Zhang    Study Date/Time:         2025 7:14AM MRN:     [id number]          FIN#:                    [id number] :     1979       Ht/Wt:                   157cm 69kg Age:     45               BSA/BMI:                 1.76m^2 28kg/m^2 Gender:  F                Baseline BP:             114 / 73 *Ordering Physician:* Flaco Stephenson  *Referring Physician:* Flaco Stephenson  *Attending Physician:* Margret Cotto  *Diagnostic Physician:* Becky Rahman MD *Sonographer:* Vince Bearden  ------------------------------------------------------------------------------ INDICATIONS:   Shortness of breath. ------------------------------------------------------------------------------ STUDY CONCLUSIONS SUMMARY: 1. Left ventricle: The cavity size is normal. Wall thickness is normal.    Systolic function is normal. The ejection fraction was measured by visual    estimation. Left ventricular diastolic function  parameters are    indeterminate at present time. The ejection fraction is 65%. 2. Right ventricle: The cavity size is normal. Systolic function is normal. ------------------------------------------------------------------------------ STUDY DATA:   Procedure:  A transthoracic echocardiogram was performed. Image quality was adequate. The study was technically limited due to restricted patient mobility.  M-mode, complete 2D, complete spectral Doppler, and color Doppler.  Study status:  Routine.  Study completion:  There were no complications. FINDINGS BASELINE ECG:   Tachycardia. LEFT VENTRICLE:  The cavity size is normal. Wall thickness is normal. There is no evidence of hypertrophy. Systolic function is normal. Unable to accurately assess left ventricular diastolic function parameters due to technical limitations.    The ejection fraction was measured by visual estimation. The ejection fraction is 65%. Left ventricular diastolic function parameters are indeterminate at present time. AORTIC VALVE:  The annulus is normal. The valve is structurally normal. The valve is trileaflet. The leaflets are normal thickness. Cusp separation is normal. Velocity is within the normal range. There is no stenosis. There is no regurgitation. The mean systolic gradient is 5mm Hg. The peak systolic gradient is 9mm Hg. The LVOT to aortic valve VTI ratio is 0.87. The valve area is 2.5cm^2. The valve area index is 1.41cm^2/m^2. The ratio of LVOT to aortic valve peak velocity is 0.75. The valve area is 2.1cm^2. The valve area index is 1.22cm^2/m^2. AORTA: Aortic root: The root is normal-sized. MITRAL VALVE:  The valve is structurally normal. The annulus is normal. The leaflets are normal thickness. Leaflet separation is normal. Inflow velocity is within the normal range. There is no evidence for stenosis. There is no regurgitation. The mean diastolic gradient is 4mm Hg. The peak diastolic gradient is 4mm Hg. The valve area (LVOT continuity)  is 3.2cm^2. The valve area index (LVOT continuity) is 1.83cm^2/m^2. ATRIAL SEPTUM:  The septum is normal. LEFT ATRIUM:  The atrium is normal in size. RIGHT VENTRICLE:  The cavity size is normal. Systolic function is normal. Systolic pressure is within the normal range. The estimated peak pressure is 15mm Hg.       The RV pressure during systole is 15mm Hg. PULMONIC VALVE:   Not well visualized. There is no regurgitation. TRICUSPID VALVE:  The valve is structurally normal. There is mild regurgitation. RIGHT ATRIUM:  Not well visualized. The atrium is normal in size.       The estimated central venous pressure is 3mm Hg. PERICARDIUM:   There is no pericardial effusion. SYSTEMIC VEINS: Inferior vena cava: The IVC is normal-sized.  Respirophasic diameter changes are in the normal range (>= 50%). ------------------------------------------------------------------------------ Measurements  Left ventricle            Value          Ref        09/27/2023  Left atrium continued      Value          Ref       09/27/2023  LEANNE, LAX chord        (N) 4.4   cm       3.8 - 5.2  4.5         Vol/bsa, S             (L) 14    ml/m^2   16 - 34   ----------  LEANNE/bsa, LAX chord    (N) 2.5   cm/m^2   2.3 - 3.1  2.5         Vol, ES, 1-p A4C       (N) 34    ml       22 - 52   ----------  PW, ED, LAX           (N) 0.9   cm       0.6 - 0.9  1.0         Vol/bsa, ES, 1-p A4C   (N) 20    ml/m^2   11 - 40   ----------  LEANNE major ax, A4C         6.1   cm       ---------- ----------  Vol, ES, 1-p A2C       (N) 25    ml       22 - 52   ----------  ESD major ax, A4C         5.1   cm       ---------- ----------  Vol/bsa, ES, 1-p A2C   (N) 14    ml/m^2   13 - 40   ----------  FS major axis, A4C        16    %        ---------- ----------  Vol, ES, 2-p               32    ml       --------- ----------  LEANNE/bsa major ax, A4C     3.5   cm/m^2   ---------- ----------  Vol/bsa, ES, 2-p       (N) 18    ml/m^2   16 - 34   ----------  ESD/bsa major ax, A4C     2.9    cm/m^2   ---------- ----------  LOLIS, A4C                  23.4  cm^2     ---------- ----------  Right atrium               Value          Ref       09/27/2023  MICHELLE, A4C                  11.9  cm^2     ---------- ----------  SI dim, ES, A4C        (N) 3.7   cm       3.4 - 5.3 ----------  FAC, A4C                  49    %        ---------- ----------  SI dim/bsa, ES, A4C    (N) 2.1   cm/m^2   1.9 - 3.1 ----------  IVS, ED               (N) 0.8   cm       0.6 - 0.9  1.0         Area, ES, A4C          (L) 7     cm^2     10 - 18   ----------  PW, ED                (N) 0.9   cm       0.6 - 0.9  1.0         Vol, ES, 1-p A4C           10    ml       --------- ----------  IVS/PW, ED                0.89           ---------- 1.01        Vol/bsa, ES, 1-p A4C   (L) 6     ml/m^2   9 - 33    ----------  EDV                   (N) 85    ml       46 - 106   90  ESV                   (N) 22    ml       14 - 42    26          Aortic valve               Value          Ref       09/27/2023  EF                    (N) 65    %        54 - 74    63          Peak v, S                  1.5   m/sec    --------- ----------  SV                        58    ml       ---------- 64          Mean v, S                  1.04  m/sec    --------- ----------  EDV/bsa               (N) 49    ml/m^2   29 - 61    52          Mean grad, S               5     mm Hg    --------- ----------  ESV/bsa               (N) 13    ml/m^2   8 - 24     15          Peak grad, S               9     mm Hg    --------- ----------  SV/bsa                    33    ml/m^2   ---------- 37          LVOT/AV, VTI ratio         0.87           --------- ----------  SV, 1-p A4C               51    ml       ---------- 32          FANNY, VTI                   2.5   cm^2     --------- ----------  SV/bsa, 1-p A4C           29    ml/m^2   ---------- 19          FANNY/bsa, VTI               1.41  cm^2/m^2 --------- ----------  EDV, 2-p              (N) 76    ml       46 - 106   51           LVOT/AV, Vpeak ratio       0.75           --------- ----------  ESV, 2-p              (N) 23    ml       14 - 42    19          FANNY, Vmax                  2.1   cm^2     --------- ----------  EF, 2-p               (N) 70    %        54 - 74    ----------  FANNY/bsa, Vmax              1.22  cm^2/m^2 --------- ----------  SV, 2-p                   53    ml       ---------- ----------  EDV/bsa, 2-p          (N) 43    ml/m^2   29 - 61    29          Mitral valve               Value          Ref       09/27/2023  ESV/bsa, 2-p          (N) 13    ml/m^2   8 - 24     11          Mean v, D                  0.88  m/sec    --------- ----------  SV/bsa, 2-p               30.3  ml/m^2   ---------- ----------  Peak E                     0.94  m/sec    --------- 1.3  E', lat geovanny TDI      (L) 9.8   cm/sec   >=10.0     ----------  Peak A                     0.87  m/sec    --------- 0.86  E/e', lat geovanny TDI    (N) 10             <=13       ----------  VTI leaflet coapt          20.9  cm       --------- ----------  E', med geovanny TDI      (N) 8.9   cm/sec   >=7.0      ----------  MiV/LVOT VTI               0.9            --------- ----------  E/e', med geovanny, TDI        11             ---------- ----------  Decel time                 123   ms       --------- 150  E', avg, TDI              9.355 cm/sec   ---------- ----------  Mean grad, D               4     mm Hg    --------- ----------  E/e', avg, TDI        (N) 10             <=14       ----------  Peak grad, D               4     mm Hg    --------- 7                                                                  Peak E/A ratio             1.1            --------- 1.52  LVOT                      Value          Ref        09/27/2023  A-VTI                      20.9  cm       --------- ----------  Diam, S                   1.9   cm       ---------- ----------  MVA, LVOT cont             3.2   cm^2     --------- ----------  Area                      2.8   cm^2     ---------- ----------   MVA/bsa, LVOT cont         1.83  cm^2/m^2 --------- ----------  Peak miles, S               1.16  m/sec    ---------- ----------  Peak grad, S              5     mm Hg    ---------- ----------  Tricuspid valve            Value          Ref       09/27/2023                                                                  Peak RV-RA grad, S         12    mm Hg    --------- ----------  Right ventricle           Value          Ref        09/27/2023  LEANNE, LAX                  2.6   cm       ---------- 2.6         Aortic root                Value          Ref       09/27/2023  TAPSE, MM             (N) 2.0   cm       >=1.7      ----------  S-T junct diam, ED     (N) 2.4   cm       2.0 - 3.2 ----------  Pressure, S               15    mm Hg    ---------- ----------  S-T junct diam/bsa, ED (N) 1.4   cm/m^2   1.1 - 1.9 ----------  S' lateral            (H) 21.1  cm/sec   6.0 - 13.4 ----------                                                                  Ascending aorta            Value          Ref       09/27/2023  Left atrium               Value          Ref        09/27/2023  AAo AP diam, ED        (N) 2.5   cm       1.9 - 3.5 ----------  AP dim, ES            (H) 4.3   cm       2.7 - 3.8  ----------  AAo AP diam/bsa, ED    (N) 1.4   cm/m^2   1.0 - 2.2 ----------  AP dim index, ES      (H) 2.4   cm/m^2   1.5 - 2.3  ----------  Area ES, A4C          (N) 13    cm^2     <=20       ----------  Pulmonary artery           Value          Ref       09/27/2023  Area/bsa ES, A4C          7.58  cm^2/m^2 ---------- ----------  Pressure, S                15    mm Hg    --------- ----------  Area ES, A2C              10    cm^2     ---------- ----------  Area/bsa ES, A2C          5.92  cm^2/m^2 ---------- ----------  Systemic veins             Value          Ref       09/27/2023  SI dim, A2C               3.8   cm       ---------- ----------  Estimated CVP              3     mm Hg    --------- ----------  Vol, S                (N) 24     ml       22 - 52    ---------- Legend: (L)  and  (H)  josr values outside specified reference range. (N)  marks values inside specified reference range. Prepared and electronically signed by Becky Rahman MD 2025 10:15     TTE:Results for orders placed during the hospital encounter of 25    TRANSTHORACIC ECHO (TTE) COMPLETE W/ W/O IMAGING AGENT    Narrative  *[facility]*  [address] [place] [id number] [phone]  Transthoracic Echocardiogram (TTE)    Patient: Dona Zhang    Study Date/Time:         2025 7:14AM  MRN:     [id number]          FIN#:                    [id number]  :     1979       Ht/Wt:                   157cm 69kg  Age:     45               BSA/BMI:                 1.76m^2 28kg/m^2  Gender:  F                Baseline BP:             114 / 73  *Ordering Physician:* Flaco StephensonReferring Physician:* Flaco Stephenson    *Attending Physician:* Margret Cotto    *Diagnostic Physician:* Becky Rahman MD  *Sonographer:* Vince Bearden    ------------------------------------------------------------------------------  INDICATIONS:   Shortness of breath.    ------------------------------------------------------------------------------  STUDY CONCLUSIONS  SUMMARY:    1. Left ventricle: The cavity size is normal. Wall thickness is normal.  Systolic function is normal. The ejection fraction was measured by visual  estimation. Left ventricular diastolic function parameters are  indeterminate at present time. The ejection fraction is 65%.  2. Right ventricle: The cavity size is normal. Systolic function is normal.    ------------------------------------------------------------------------------  STUDY DATA:   Procedure:  A transthoracic echocardiogram was performed. Image  quality was adequate. The study was technically limited due to restricted  patient mobility.  M-mode, complete 2D, complete spectral Doppler, and color  Doppler.  Study status:   Routine.  Study completion:  There were no  complications.    FINDINGS    BASELINE ECG:   Tachycardia.  LEFT VENTRICLE:  The cavity size is normal. Wall thickness is normal. There is  no evidence of hypertrophy. Systolic function is normal. Unable to accurately  assess left ventricular diastolic function parameters due to technical  limitations.    The ejection fraction was measured by visual estimation. The  ejection fraction is 65%. Left ventricular diastolic function parameters are  indeterminate at present time.    AORTIC VALVE:  The annulus is normal. The valve is structurally normal. The  valve is trileaflet. The leaflets are normal thickness. Cusp separation is  normal. Velocity is within the normal range. There is no stenosis. There is no  regurgitation. The mean systolic gradient is 5mm Hg. The peak systolic  gradient is 9mm Hg. The LVOT to aortic valve VTI ratio is 0.87. The valve area  is 2.5cm^2. The valve area index is 1.41cm^2/m^2. The ratio of LVOT to aortic  valve peak velocity is 0.75. The valve area is 2.1cm^2. The valve area index  is 1.22cm^2/m^2.    AORTA:  Aortic root: The root is normal-sized.    MITRAL VALVE:  The valve is structurally normal. The annulus is normal. The  leaflets are normal thickness. Leaflet separation is normal. Inflow velocity  is within the normal range. There is no evidence for stenosis. There is no  regurgitation. The mean diastolic gradient is 4mm Hg. The peak diastolic  gradient is 4mm Hg. The valve area (LVOT continuity) is 3.2cm^2. The valve  area index (LVOT continuity) is 1.83cm^2/m^2.    ATRIAL SEPTUM:  The septum is normal.    LEFT ATRIUM:  The atrium is normal in size.    RIGHT VENTRICLE:  The cavity size is normal. Systolic function is normal.  Systolic pressure is within the normal range. The estimated peak pressure is  15mm Hg.       The RV pressure during systole is 15mm Hg.    PULMONIC VALVE:   Not well visualized. There is no regurgitation.    TRICUSPID  VALVE:  The valve is structurally normal. There is mild  regurgitation.    RIGHT ATRIUM:  Not well visualized. The atrium is normal in size.       The  estimated central venous pressure is 3mm Hg.    PERICARDIUM:   There is no pericardial effusion.    SYSTEMIC VEINS:  Inferior vena cava: The IVC is normal-sized.  Respirophasic diameter changes  are in the normal range (>= 50%).    ------------------------------------------------------------------------------  Measurements    Left ventricle            Value          Ref        09/27/2023  Left atrium continued      Value          Ref       09/27/2023  LEANNE, LAX chord        (N) 4.4   cm       3.8 - 5.2  4.5         Vol/bsa, S             (L) 14    ml/m^2   16 - 34   ----------  LEANNE/bsa, LAX chord    (N) 2.5   cm/m^2   2.3 - 3.1  2.5         Vol, ES, 1-p A4C       (N) 34    ml       22 - 52   ----------  PW, ED, LAX           (N) 0.9   cm       0.6 - 0.9  1.0         Vol/bsa, ES, 1-p A4C   (N) 20    ml/m^2   11 - 40   ----------  LEANNE major ax, A4C         6.1   cm       ---------- ----------  Vol, ES, 1-p A2C       (N) 25    ml       22 - 52   ----------  ESD major ax, A4C         5.1   cm       ---------- ----------  Vol/bsa, ES, 1-p A2C   (N) 14    ml/m^2   13 - 40   ----------  FS major axis, A4C        16    %        ---------- ----------  Vol, ES, 2-p               32    ml       --------- ----------  LEANNE/bsa major ax, A4C     3.5   cm/m^2   ---------- ----------  Vol/bsa, ES, 2-p       (N) 18    ml/m^2   16 - 34   ----------  ESD/bsa major ax, A4C     2.9   cm/m^2   ---------- ----------  LOLIS, A4C                  23.4  cm^2     ---------- ----------  Right atrium               Value          Ref       09/27/2023  MICHELLE, A4C                  11.9  cm^2     ---------- ----------  SI dim, ES, A4C        (N) 3.7   cm       3.4 - 5.3 ----------  FAC, A4C                  49    %        ---------- ----------  SI dim/bsa, ES, A4C    (N) 2.1   cm/m^2   1.9 - 3.1  ----------  IVS, ED               (N) 0.8   cm       0.6 - 0.9  1.0         Area, ES, A4C          (L) 7     cm^2     10 - 18   ----------  PW, ED                (N) 0.9   cm       0.6 - 0.9  1.0         Vol, ES, 1-p A4C           10    ml       --------- ----------  IVS/PW, ED                0.89           ---------- 1.01        Vol/bsa, ES, 1-p A4C   (L) 6     ml/m^2   9 - 33    ----------  EDV                   (N) 85    ml       46 - 106   90  ESV                   (N) 22    ml       14 - 42    26          Aortic valve               Value          Ref       09/27/2023  EF                    (N) 65    %        54 - 74    63          Peak v, S                  1.5   m/sec    --------- ----------  SV                        58    ml       ---------- 64          Mean v, S                  1.04  m/sec    --------- ----------  EDV/bsa               (N) 49    ml/m^2   29 - 61    52          Mean grad, S               5     mm Hg    --------- ----------  ESV/bsa               (N) 13    ml/m^2   8 - 24     15          Peak grad, S               9     mm Hg    --------- ----------  SV/bsa                    33    ml/m^2   ---------- 37          LVOT/AV, VTI ratio         0.87           --------- ----------  SV, 1-p A4C               51    ml       ---------- 32          FANNY, VTI                   2.5   cm^2     --------- ----------  SV/bsa, 1-p A4C           29    ml/m^2   ---------- 19          FANNY/bsa, VTI               1.41  cm^2/m^2 --------- ----------  EDV, 2-p              (N) 76    ml       46 - 106   51          LVOT/AV, Vpeak ratio       0.75           --------- ----------  ESV, 2-p              (N) 23    ml       14 - 42    19          FANNY, Vmax                  2.1   cm^2     --------- ----------  EF, 2-p               (N) 70    %        54 - 74    ----------  FANNY/bsa, Vmax              1.22  cm^2/m^2 --------- ----------  SV, 2-p                   53    ml       ---------- ----------  EDV/bsa, 2-p          (N)  43    ml/m^2   29 - 61    29          Mitral valve               Value          Ref       09/27/2023  ESV/bsa, 2-p          (N) 13    ml/m^2   8 - 24     11          Mean v, D                  0.88  m/sec    --------- ----------  SV/bsa, 2-p               30.3  ml/m^2   ---------- ----------  Peak E                     0.94  m/sec    --------- 1.3  E', lat geovanny, TDI      (L) 9.8   cm/sec   >=10.0     ----------  Peak A                     0.87  m/sec    --------- 0.86  E/e', lat geovanny, TDI    (N) 10             <=13       ----------  VTI leaflet coapt          20.9  cm       --------- ----------  E', med geovanny, TDI      (N) 8.9   cm/sec   >=7.0      ----------  MiV/LVOT VTI               0.9            --------- ----------  E/e', med geovanny, TDI        11             ---------- ----------  Decel time                 123   ms       --------- 150  E', avg, TDI              9.355 cm/sec   ---------- ----------  Mean grad, D               4     mm Hg    --------- ----------  E/e', avg, TDI        (N) 10             <=14       ----------  Peak grad, D               4     mm Hg    --------- 7  Peak E/A ratio             1.1            --------- 1.52  LVOT                      Value          Ref        09/27/2023  A-VTI                      20.9  cm       --------- ----------  Diam, S                   1.9   cm       ---------- ----------  MVA, LVOT cont             3.2   cm^2     --------- ----------  Area                      2.8   cm^2     ---------- ----------  MVA/bsa, LVOT cont         1.83  cm^2/m^2 --------- ----------  Peak miles, S               1.16  m/sec    ---------- ----------  Peak grad, S              5     mm Hg    ---------- ----------  Tricuspid valve            Value          Ref       09/27/2023  Peak RV-RA grad, S         12    mm Hg    --------- ----------  Right ventricle           Value          Ref        09/27/2023  LEANNE, LAX                  2.6   cm       ---------- 2.6         Aortic root                 Value          Ref       09/27/2023  TAPSE, MM             (N) 2.0   cm       >=1.7      ----------  S-T junct diam, ED     (N) 2.4   cm       2.0 - 3.2 ----------  Pressure, S               15    mm Hg    ---------- ----------  S-T junct diam/bsa, ED (N) 1.4   cm/m^2   1.1 - 1.9 ----------  S' lateral            (H) 21.1  cm/sec   6.0 - 13.4 ----------  Ascending aorta            Value          Ref       09/27/2023  Left atrium               Value          Ref        09/27/2023  AAo AP diam, ED        (N) 2.5   cm       1.9 - 3.5 ----------  AP dim, ES            (H) 4.3   cm       2.7 - 3.8  ----------  AAo AP diam/bsa, ED    (N) 1.4   cm/m^2   1.0 - 2.2 ----------  AP dim index, ES      (H) 2.4   cm/m^2   1.5 - 2.3  ----------  Area ES, A4C          (N) 13    cm^2     <=20       ----------  Pulmonary artery           Value          Ref       09/27/2023  Area/bsa ES, A4C          7.58  cm^2/m^2 ---------- ----------  Pressure, S                15    mm Hg    --------- ----------  Area ES, A2C              10    cm^2     ---------- ----------  Area/bsa ES, A2C          5.92  cm^2/m^2 ---------- ----------  Systemic veins             Value          Ref       09/27/2023  SI dim, A2C               3.8   cm       ---------- ----------  Estimated CVP              3     mm Hg    --------- ----------  Vol, S                (N) 24    ml       22 - 52    ----------  Legend:  (L)  and  (H)  josr values outside specified reference range.    (N)  marks values inside specified reference range.    Prepared and electronically signed by  Becky Rahman MD  06/18/2025 10:15    MALLORY:No results found for this or any previous visit.        CONSULTS:        IP Consult Orders (From admission, onward)       Start     Ordered    06/19/25 1324  Inpatient consult to Cardiology  ONE TIME        Provider:  Jose Samuel MD    06/19/25 1324    06/18/25 0751  Inpatient consult to Hematology  ONE TIME        Provider:  Fabio Morel MD     06/18/25 0751    06/17/25 2217  INPATIENT CONSULT TO IR  ONE TIME        Provider:  Lisa Austin MD    06/17/25 2216 06/17/25 1939  Inpatient consult to GI  ONE TIME        Provider:  Gab Rees DO    06/17/25 1938                      PROCEDURES:    paracentesis      PENDING ITEMS  Pending Labs       Order Current Status    Anaerobe/Aerobe, Bacterial Culture With Gram Stain Preliminary result    Blood Culture Preliminary result    Blood Culture Preliminary result    Prepare Platelets: 2 Units Preliminary result             INCIDENTAL FINDINGS TO FOLLOW UP  none  Patient/surrogate decision maker made aware of all findings and recommendations for follow up.  They expressed full understanding.      Patient's listed PCP is Tisha Mead PA-C  Notified by epic chat n/a   Patient or family advised to Follow up with:  No follow-up provider specified.    No discharge procedures on file.     TIME TAKEN FOR DISCHARGE:  45 minutes    Discharge instructions, medications and followup appointment were discussed with the patient and after visit summary was given.      Patient is to follow up with PCP as noted above. All specialists and consultants as noted. Patient/family educated about compliance with medications and expectations for the course of treatment.   Pt was also instructed to call 911 and/or report to the emergency room should symptoms recurr or if any other problems arise.      Summary of your Discharge Medications        Take these Medications        Details   folic acid 1 MG tablet  Commonly known as: FOLATE   Take 1 mg by mouth daily.     furosemide 40 MG tablet  Commonly known as: LASIX   Take 40 mg by mouth daily.     gabapentin 100 MG capsule  Commonly known as: NEURONTIN   Take 100 mg by mouth in the morning and 100 mg in the evening.     rifAXIMin 550 MG Tab  Commonly known as: XIFAXAN   Take 550 mg by mouth every 12 hours.     spironolactone 100 MG tablet  Commonly known as: ALDACTONE    Take 100 mg by mouth daily.     thiamine 100 MG tablet  Commonly known as: VITAMIN B1   Take 100 mg by mouth daily.             All potential adverse effects and drug drug interactions reviewed with patient and or caregiver     Auto

## (undated) DEVICE — SNARE LRG

## (undated) DEVICE — NDL INJ SCLERO INTERJECT 23G

## (undated) DEVICE — SOL IRR BAG H2O 1000ML

## (undated) DEVICE — CATH IV SAFE BC 22G X 1" (BLUE)

## (undated) DEVICE — SYR LUER LOK 30CC

## (undated) DEVICE — SOL IRR POUR H2O 1000ML

## (undated) DEVICE — CATH ELCTR GLIDE PRB 7FR

## (undated) DEVICE — TUBING CANNULA SALTER LABS NASAL ADULT 7FT

## (undated) DEVICE — BITE BLOCK MAXI RUBBER STAMP

## (undated) DEVICE — STERIS DEFENDO 3-PIECE KIT (AIR/WATER, SUCTION & BIOPSY VALVES)

## (undated) DEVICE — TUBING IV SET SECOND 34" W/O LOK-BLUNT

## (undated) DEVICE — SOL IRR POUR H2O 500ML

## (undated) DEVICE — CATH ELECHMSTAT  INJ 7FR 210CM

## (undated) DEVICE — GLV 8 PROTEXIS (WHITE)

## (undated) DEVICE — SUCTION YANKAUER TAPERED BULBOUS NO VENT

## (undated) DEVICE — SOL IRR NS 0.9% 250ML

## (undated) DEVICE — ELCTR GROUNDING PAD ADULT COVIDIEN

## (undated) DEVICE — SOL IRR POUR NS 0.9% 1000ML

## (undated) DEVICE — DRSG CURITY GAUZE SPONGE 4 X 4" 12-PLY

## (undated) DEVICE — Device

## (undated) DEVICE — TUBING IV SET GRAVITY 3Y 100" MACRO

## (undated) DEVICE — SENSOR O2 FINGER XL ADULT 24/BX 6BX/CA

## (undated) DEVICE — SYR IV POSIFLUSH NS 3ML 30/TY

## (undated) DEVICE — TUBE O2 SUPL CRUSH RESIS CONN SOUTHSIDE ONLY

## (undated) DEVICE — FORCEP RADIAL JAW 4 W NDL 2.2MM 2.8MM 160CM YELLOW DISP

## (undated) DEVICE — CATH IV SAFE BC 20G X 1.16" (PINK)

## (undated) DEVICE — TUBING SUCTION CONN 6FT STERILE

## (undated) DEVICE — SET IV PUMP BLOOD 1VALVE 180FILTER NON-DEHP

## (undated) DEVICE — FORMALIN CUPS 10% BUFFERED

## (undated) DEVICE — BRUSH CYTO ENDO

## (undated) DEVICE — BRUSH COLONOSCOPY CYTOLOGY

## (undated) DEVICE — MARKER ENDO SPOT EX